# Patient Record
Sex: FEMALE | Race: OTHER | HISPANIC OR LATINO | Employment: UNEMPLOYED | URBAN - METROPOLITAN AREA
[De-identification: names, ages, dates, MRNs, and addresses within clinical notes are randomized per-mention and may not be internally consistent; named-entity substitution may affect disease eponyms.]

---

## 2022-04-07 ENCOUNTER — TELEPHONE (OUTPATIENT)
Dept: SCHEDULING | Facility: IMAGING CENTER | Age: 62
End: 2022-04-07

## 2022-11-25 ENCOUNTER — APPOINTMENT (OUTPATIENT)
Dept: RADIOLOGY | Facility: MEDICAL CENTER | Age: 62
DRG: 871 | End: 2022-11-25
Attending: EMERGENCY MEDICINE
Payer: COMMERCIAL

## 2022-11-25 ENCOUNTER — HOSPITAL ENCOUNTER (INPATIENT)
Facility: MEDICAL CENTER | Age: 62
LOS: 1 days | DRG: 871 | End: 2022-11-26
Attending: EMERGENCY MEDICINE | Admitting: STUDENT IN AN ORGANIZED HEALTH CARE EDUCATION/TRAINING PROGRAM
Payer: COMMERCIAL

## 2022-11-25 DIAGNOSIS — A41.9 SEPSIS WITHOUT ACUTE ORGAN DYSFUNCTION, DUE TO UNSPECIFIED ORGANISM (HCC): ICD-10-CM

## 2022-11-25 DIAGNOSIS — J10.1 INFLUENZA A: ICD-10-CM

## 2022-11-25 DIAGNOSIS — R10.84 GENERALIZED ABDOMINAL PAIN: ICD-10-CM

## 2022-11-25 DIAGNOSIS — J18.9 PNEUMONIA OF LEFT LOWER LOBE DUE TO INFECTIOUS ORGANISM: ICD-10-CM

## 2022-11-25 LAB
ALBUMIN SERPL BCP-MCNC: 3.2 G/DL (ref 3.2–4.9)
ALBUMIN/GLOB SERPL: 0.5 G/DL
ALP SERPL-CCNC: 128 U/L (ref 30–99)
ALT SERPL-CCNC: 10 U/L (ref 2–50)
ANION GAP SERPL CALC-SCNC: 13 MMOL/L (ref 7–16)
AST SERPL-CCNC: 18 U/L (ref 12–45)
BASOPHILS # BLD AUTO: 0.7 % (ref 0–1.8)
BASOPHILS # BLD: 0.1 K/UL (ref 0–0.12)
BILIRUB SERPL-MCNC: 0.4 MG/DL (ref 0.1–1.5)
BUN SERPL-MCNC: 16 MG/DL (ref 8–22)
CALCIUM SERPL-MCNC: 8.6 MG/DL (ref 8.5–10.5)
CHLORIDE SERPL-SCNC: 97 MMOL/L (ref 96–112)
CO2 SERPL-SCNC: 22 MMOL/L (ref 20–33)
CREAT SERPL-MCNC: 0.67 MG/DL (ref 0.5–1.4)
CRP SERPL HS-MCNC: 23.81 MG/DL (ref 0–0.75)
EKG IMPRESSION: NORMAL
EOSINOPHIL # BLD AUTO: 0.03 K/UL (ref 0–0.51)
EOSINOPHIL NFR BLD: 0.2 % (ref 0–6.9)
ERYTHROCYTE [DISTWIDTH] IN BLOOD BY AUTOMATED COUNT: 47.8 FL (ref 35.9–50)
FLUAV RNA SPEC QL NAA+PROBE: POSITIVE
FLUBV RNA SPEC QL NAA+PROBE: NEGATIVE
GFR SERPLBLD CREATININE-BSD FMLA CKD-EPI: 98 ML/MIN/1.73 M 2
GLOBULIN SER CALC-MCNC: 6 G/DL (ref 1.9–3.5)
GLUCOSE SERPL-MCNC: 134 MG/DL (ref 65–99)
HCT VFR BLD AUTO: 37.3 % (ref 37–47)
HGB BLD-MCNC: 12.5 G/DL (ref 12–16)
IMM GRANULOCYTES # BLD AUTO: 0.13 K/UL (ref 0–0.11)
IMM GRANULOCYTES NFR BLD AUTO: 1 % (ref 0–0.9)
LACTATE SERPL-SCNC: 1.7 MMOL/L (ref 0.5–2)
LACTATE SERPL-SCNC: 2.4 MMOL/L (ref 0.5–2)
LIPASE SERPL-CCNC: 40 U/L (ref 11–82)
LYMPHOCYTES # BLD AUTO: 1.03 K/UL (ref 1–4.8)
LYMPHOCYTES NFR BLD: 7.6 % (ref 22–41)
MCH RBC QN AUTO: 30.2 PG (ref 27–33)
MCHC RBC AUTO-ENTMCNC: 33.5 G/DL (ref 33.6–35)
MCV RBC AUTO: 90.1 FL (ref 81.4–97.8)
MONOCYTES # BLD AUTO: 0.68 K/UL (ref 0–0.85)
MONOCYTES NFR BLD AUTO: 5 % (ref 0–13.4)
NEUTROPHILS # BLD AUTO: 11.58 K/UL (ref 2–7.15)
NEUTROPHILS NFR BLD: 85.5 % (ref 44–72)
NRBC # BLD AUTO: 0 K/UL
NRBC BLD-RTO: 0 /100 WBC
PLATELET # BLD AUTO: 288 K/UL (ref 164–446)
PMV BLD AUTO: 11 FL (ref 9–12.9)
POTASSIUM SERPL-SCNC: 3.5 MMOL/L (ref 3.6–5.5)
PROCALCITONIN SERPL-MCNC: 0.5 NG/ML
PROT SERPL-MCNC: 9.2 G/DL (ref 6–8.2)
RBC # BLD AUTO: 4.14 M/UL (ref 4.2–5.4)
RSV RNA SPEC QL NAA+PROBE: NEGATIVE
SARS-COV-2 RNA RESP QL NAA+PROBE: NOTDETECTED
SODIUM SERPL-SCNC: 132 MMOL/L (ref 135–145)
SPECIMEN SOURCE: ABNORMAL
TROPONIN T SERPL-MCNC: 8 NG/L (ref 6–19)
WBC # BLD AUTO: 13.6 K/UL (ref 4.8–10.8)

## 2022-11-25 PROCEDURE — 96375 TX/PRO/DX INJ NEW DRUG ADDON: CPT

## 2022-11-25 PROCEDURE — 99285 EMERGENCY DEPT VISIT HI MDM: CPT

## 2022-11-25 PROCEDURE — 0241U HCHG SARS-COV-2 COVID-19 NFCT DS RESP RNA 4 TRGT MIC: CPT

## 2022-11-25 PROCEDURE — 71045 X-RAY EXAM CHEST 1 VIEW: CPT

## 2022-11-25 PROCEDURE — A9270 NON-COVERED ITEM OR SERVICE: HCPCS | Performed by: EMERGENCY MEDICINE

## 2022-11-25 PROCEDURE — 85025 COMPLETE CBC W/AUTO DIFF WBC: CPT

## 2022-11-25 PROCEDURE — 36415 COLL VENOUS BLD VENIPUNCTURE: CPT

## 2022-11-25 PROCEDURE — 83605 ASSAY OF LACTIC ACID: CPT

## 2022-11-25 PROCEDURE — 83036 HEMOGLOBIN GLYCOSYLATED A1C: CPT

## 2022-11-25 PROCEDURE — 700105 HCHG RX REV CODE 258: Performed by: EMERGENCY MEDICINE

## 2022-11-25 PROCEDURE — C9803 HOPD COVID-19 SPEC COLLECT: HCPCS | Performed by: EMERGENCY MEDICINE

## 2022-11-25 PROCEDURE — 83690 ASSAY OF LIPASE: CPT

## 2022-11-25 PROCEDURE — 87040 BLOOD CULTURE FOR BACTERIA: CPT

## 2022-11-25 PROCEDURE — 93005 ELECTROCARDIOGRAM TRACING: CPT

## 2022-11-25 PROCEDURE — 93005 ELECTROCARDIOGRAM TRACING: CPT | Performed by: EMERGENCY MEDICINE

## 2022-11-25 PROCEDURE — 700102 HCHG RX REV CODE 250 W/ 637 OVERRIDE(OP): Performed by: EMERGENCY MEDICINE

## 2022-11-25 PROCEDURE — 700111 HCHG RX REV CODE 636 W/ 250 OVERRIDE (IP): Performed by: EMERGENCY MEDICINE

## 2022-11-25 PROCEDURE — 84145 PROCALCITONIN (PCT): CPT

## 2022-11-25 PROCEDURE — 84484 ASSAY OF TROPONIN QUANT: CPT

## 2022-11-25 PROCEDURE — 80053 COMPREHEN METABOLIC PANEL: CPT

## 2022-11-25 PROCEDURE — 86140 C-REACTIVE PROTEIN: CPT

## 2022-11-25 RX ORDER — SODIUM CHLORIDE 9 MG/ML
1000 INJECTION, SOLUTION INTRAVENOUS ONCE
Status: COMPLETED | OUTPATIENT
Start: 2022-11-25 | End: 2022-11-26

## 2022-11-25 RX ORDER — ACETAMINOPHEN 325 MG/1
650 TABLET ORAL ONCE
Status: COMPLETED | OUTPATIENT
Start: 2022-11-25 | End: 2022-11-25

## 2022-11-25 RX ORDER — CEFTRIAXONE 1 G/1
1000 INJECTION, POWDER, FOR SOLUTION INTRAMUSCULAR; INTRAVENOUS ONCE
Status: COMPLETED | OUTPATIENT
Start: 2022-11-25 | End: 2022-11-25

## 2022-11-25 RX ORDER — AZITHROMYCIN 500 MG/5ML
500 INJECTION, POWDER, LYOPHILIZED, FOR SOLUTION INTRAVENOUS ONCE
Status: COMPLETED | OUTPATIENT
Start: 2022-11-25 | End: 2022-11-26

## 2022-11-25 RX ORDER — SODIUM CHLORIDE 9 MG/ML
1000 INJECTION, SOLUTION INTRAVENOUS ONCE
Status: COMPLETED | OUTPATIENT
Start: 2022-11-25 | End: 2022-11-25

## 2022-11-25 RX ADMIN — SODIUM CHLORIDE 1000 ML: 9 INJECTION, SOLUTION INTRAVENOUS at 23:30

## 2022-11-25 RX ADMIN — SODIUM CHLORIDE 1000 ML: 9 INJECTION, SOLUTION INTRAVENOUS at 21:51

## 2022-11-25 RX ADMIN — CEFTRIAXONE SODIUM 1000 MG: 1 INJECTION, POWDER, FOR SOLUTION INTRAMUSCULAR; INTRAVENOUS at 21:50

## 2022-11-25 RX ADMIN — ACETAMINOPHEN 650 MG: 325 TABLET, FILM COATED ORAL at 21:35

## 2022-11-26 VITALS
HEART RATE: 79 BPM | WEIGHT: 97.22 LBS | TEMPERATURE: 98.2 F | SYSTOLIC BLOOD PRESSURE: 114 MMHG | BODY MASS INDEX: 17.89 KG/M2 | RESPIRATION RATE: 13 BRPM | OXYGEN SATURATION: 98 % | HEIGHT: 62 IN | DIASTOLIC BLOOD PRESSURE: 60 MMHG

## 2022-11-26 PROBLEM — Z91.148 HISTORY OF MEDICATION NONCOMPLIANCE: Status: ACTIVE | Noted: 2022-11-26

## 2022-11-26 PROBLEM — B20 HIV (HUMAN IMMUNODEFICIENCY VIRUS INFECTION) (HCC): Status: ACTIVE | Noted: 2022-11-26

## 2022-11-26 PROBLEM — E87.20 LACTIC ACIDOSIS: Status: ACTIVE | Noted: 2022-11-26

## 2022-11-26 PROBLEM — A41.9 SEPSIS (HCC): Status: ACTIVE | Noted: 2022-11-26

## 2022-11-26 PROBLEM — J10.1 INFLUENZA A: Status: ACTIVE | Noted: 2022-11-26

## 2022-11-26 PROBLEM — J18.9 PNEUMONIA: Status: ACTIVE | Noted: 2022-11-26

## 2022-11-26 PROBLEM — E87.6 HYPOKALEMIA: Status: ACTIVE | Noted: 2022-11-26

## 2022-11-26 PROBLEM — F15.10 METHAMPHETAMINE USE (HCC): Status: ACTIVE | Noted: 2022-11-26

## 2022-11-26 PROBLEM — F19.10 POLYSUBSTANCE ABUSE (HCC): Status: ACTIVE | Noted: 2022-11-26

## 2022-11-26 PROBLEM — E87.1 HYPONATREMIA: Status: ACTIVE | Noted: 2022-11-26

## 2022-11-26 LAB
AMPHET UR QL SCN: POSITIVE
BARBITURATES UR QL SCN: NEGATIVE
BENZODIAZ UR QL SCN: NEGATIVE
BZE UR QL SCN: NEGATIVE
CANNABINOIDS UR QL SCN: NEGATIVE
EST. AVERAGE GLUCOSE BLD GHB EST-MCNC: 123 MG/DL
HBA1C MFR BLD: 5.9 % (ref 4–5.6)
METHADONE UR QL SCN: NEGATIVE
OPIATES UR QL SCN: POSITIVE
OXYCODONE UR QL SCN: NEGATIVE
PCP UR QL SCN: NEGATIVE
PROPOXYPH UR QL SCN: NEGATIVE

## 2022-11-26 PROCEDURE — 96372 THER/PROPH/DIAG INJ SC/IM: CPT

## 2022-11-26 PROCEDURE — 87205 SMEAR GRAM STAIN: CPT

## 2022-11-26 PROCEDURE — 306637 HCHG MISC ORTHO ITEM RC 0274

## 2022-11-26 PROCEDURE — 700101 HCHG RX REV CODE 250: Performed by: EMERGENCY MEDICINE

## 2022-11-26 PROCEDURE — 96365 THER/PROPH/DIAG IV INF INIT: CPT

## 2022-11-26 PROCEDURE — 700111 HCHG RX REV CODE 636 W/ 250 OVERRIDE (IP): Performed by: EMERGENCY MEDICINE

## 2022-11-26 PROCEDURE — 80307 DRUG TEST PRSMV CHEM ANLYZR: CPT

## 2022-11-26 PROCEDURE — 87070 CULTURE OTHR SPECIMN AEROBIC: CPT

## 2022-11-26 PROCEDURE — 700117 HCHG RX CONTRAST REV CODE 255: Performed by: EMERGENCY MEDICINE

## 2022-11-26 PROCEDURE — 700111 HCHG RX REV CODE 636 W/ 250 OVERRIDE (IP): Performed by: INTERNAL MEDICINE

## 2022-11-26 PROCEDURE — 99223 1ST HOSP IP/OBS HIGH 75: CPT | Mod: GC | Performed by: STUDENT IN AN ORGANIZED HEALTH CARE EDUCATION/TRAINING PROGRAM

## 2022-11-26 PROCEDURE — 74177 CT ABD & PELVIS W/CONTRAST: CPT

## 2022-11-26 PROCEDURE — 700105 HCHG RX REV CODE 258: Performed by: EMERGENCY MEDICINE

## 2022-11-26 PROCEDURE — 700105 HCHG RX REV CODE 258: Performed by: INTERNAL MEDICINE

## 2022-11-26 RX ORDER — AMOXICILLIN 250 MG
2 CAPSULE ORAL 2 TIMES DAILY
Status: DISCONTINUED | OUTPATIENT
Start: 2022-11-26 | End: 2022-11-26 | Stop reason: HOSPADM

## 2022-11-26 RX ORDER — AZITHROMYCIN 250 MG/1
250 TABLET, FILM COATED ORAL DAILY
Status: DISCONTINUED | OUTPATIENT
Start: 2022-11-27 | End: 2022-11-26

## 2022-11-26 RX ORDER — HYDRALAZINE HYDROCHLORIDE 20 MG/ML
10 INJECTION INTRAMUSCULAR; INTRAVENOUS EVERY 4 HOURS PRN
Status: DISCONTINUED | OUTPATIENT
Start: 2022-11-26 | End: 2022-11-26 | Stop reason: HOSPADM

## 2022-11-26 RX ORDER — ENOXAPARIN SODIUM 100 MG/ML
40 INJECTION SUBCUTANEOUS DAILY
Status: DISCONTINUED | OUTPATIENT
Start: 2022-11-26 | End: 2022-11-26 | Stop reason: HOSPADM

## 2022-11-26 RX ORDER — OSELTAMIVIR PHOSPHATE 75 MG/1
75 CAPSULE ORAL 2 TIMES DAILY
Status: DISCONTINUED | OUTPATIENT
Start: 2022-11-26 | End: 2022-11-26 | Stop reason: HOSPADM

## 2022-11-26 RX ORDER — POTASSIUM CHLORIDE 20 MEQ/1
40 TABLET, EXTENDED RELEASE ORAL ONCE
Status: DISCONTINUED | OUTPATIENT
Start: 2022-11-26 | End: 2022-11-26 | Stop reason: HOSPADM

## 2022-11-26 RX ORDER — SODIUM CHLORIDE 9 MG/ML
INJECTION, SOLUTION INTRAVENOUS CONTINUOUS
Status: DISCONTINUED | OUTPATIENT
Start: 2022-11-26 | End: 2022-11-26 | Stop reason: HOSPADM

## 2022-11-26 RX ORDER — SULFAMETHOXAZOLE AND TRIMETHOPRIM 800; 160 MG/1; MG/1
2 TABLET ORAL 3 TIMES DAILY
Status: DISCONTINUED | OUTPATIENT
Start: 2022-11-26 | End: 2022-11-26 | Stop reason: HOSPADM

## 2022-11-26 RX ORDER — ACETAMINOPHEN 325 MG/1
650 TABLET ORAL EVERY 6 HOURS PRN
Status: DISCONTINUED | OUTPATIENT
Start: 2022-11-26 | End: 2022-11-26

## 2022-11-26 RX ORDER — POLYETHYLENE GLYCOL 3350 17 G/17G
1 POWDER, FOR SOLUTION ORAL
Status: DISCONTINUED | OUTPATIENT
Start: 2022-11-26 | End: 2022-11-26 | Stop reason: HOSPADM

## 2022-11-26 RX ORDER — BISACODYL 10 MG
10 SUPPOSITORY, RECTAL RECTAL
Status: DISCONTINUED | OUTPATIENT
Start: 2022-11-26 | End: 2022-11-26 | Stop reason: HOSPADM

## 2022-11-26 RX ADMIN — IOHEXOL 95 ML: 350 INJECTION, SOLUTION INTRAVENOUS at 01:06

## 2022-11-26 RX ADMIN — SODIUM CHLORIDE: 9 INJECTION, SOLUTION INTRAVENOUS at 03:30

## 2022-11-26 RX ADMIN — ENOXAPARIN SODIUM 40 MG: 40 INJECTION SUBCUTANEOUS at 03:05

## 2022-11-26 RX ADMIN — AZITHROMYCIN MONOHYDRATE 500 MG: 500 INJECTION, POWDER, LYOPHILIZED, FOR SOLUTION INTRAVENOUS at 00:33

## 2022-11-26 ASSESSMENT — ENCOUNTER SYMPTOMS
HEADACHES: 0
CHILLS: 1
PALPITATIONS: 0
MYALGIAS: 0
PHOTOPHOBIA: 0
ORTHOPNEA: 0
CLAUDICATION: 0
COUGH: 1
SPUTUM PRODUCTION: 1
VOMITING: 0
FEVER: 1
DIZZINESS: 0
HEARTBURN: 0
BLURRED VISION: 0
WHEEZING: 0
NECK PAIN: 0
NAUSEA: 1

## 2022-11-26 ASSESSMENT — LIFESTYLE VARIABLES: SUBSTANCE_ABUSE: 1

## 2022-11-26 NOTE — ASSESSMENT & PLAN NOTE
Presentation concerning for underlying pneumonia, with leukocytosis, fever, positive Pro-Alex and bilateral lung base opacifications on CT scan.  S/p 1 dose of ceftriaxone and azithromycin in the ED  Blood cultures collected in the ED  Sputum cultures have been ordered and are pending    -We will continue ceftriaxone and 250 mg azithromycin for total of 5 days  -RT protocol

## 2022-11-26 NOTE — ED NOTES
PT ambulated to the RR with assistance. Pt upon ambulation back to the room wondered off and was found over by PEDS triage. Pt was helped back to the room at this time. Attending notified and will come down to see her.

## 2022-11-26 NOTE — ASSESSMENT & PLAN NOTE
Secondary to poor diet and vomiting in the setting of ongoing infection  -Potassium repletion ordered

## 2022-11-26 NOTE — ASSESSMENT & PLAN NOTE
Unclear when patient required influenza as she reports that her symptoms have been ongoing for the last 3 weeks.  Will initiate oseltamavir considering patient is high risk, 75 mg twice daily for 5 days  Incentive spirometry  RT protocol  Telemetry monitoring with continuous pulse ox

## 2022-11-26 NOTE — ASSESSMENT & PLAN NOTE
This is Sepsis Present on admission  SIRS criteria identified on my evaluation include: Fever, with temperature greater than 101 deg F, Tachycardia, with heart rate greater than 90 BPM and Leukocytosis, with WBC greater than 12,000  Source is pulmonary/lungs  Sepsis protocol initiated  Fluid resuscitation ordered per protocol  Crystalloid Fluid Administration: Fluid resuscitation ordered per standard protocol - 30 mL/kg per current or ideal body weight  IV antibiotics as appropriate for source of sepsis  Reassessment: I have reassessed the patient's hemodynamic status

## 2022-11-26 NOTE — DISCHARGE SUMMARY
[facility] Internal Medicine Discharge Summary    Attending: John Bowen M.d.  Senior Resident: Dr. Downey   Contact Number: [phone]    CHIEF COMPLAINT ON ADMISSION  Chief Complaint   Patient presents with    Flu Like Symptoms     Cough, N/V/D, fever, no appetite x 3 weeks. Fever 102f on arrival. EMS gave 1g tylenol and 600mg ibuprofen PTA. Hx HIV.        Reason for Admission  Nausea, vomiting, diarrhea     Admission Date  11/25/2022    CODE STATUS  Full Code    HPI & HOSPITAL COURSE   Karla Baker is a 62 y.o. female who presented 11/25/2022 with past medical history of HIV (following up with [facility], she was on Triumeq 600- mg, 1 tablet daily that she last filled 8/31/2022 for 30-day supply and has been off of HIV therapy since.  She also has a history of drug abuse, describes that she uses heroin daily, mostly subcutaneous and occasionally IV.  She is a current smoker, around 1 pack/day, 20-year smoking history. She is positive for amphetamines and opiates.  She describes that over the last 2-3 weeks she has been having worsening fevers/chills, malaise/fatigue, along with cough productive of yellow green phlegm.  She tried getting into the [facility] clinic this week, however reports that she was sent home with recommendation to come back on this coming Monday.  She is influenza A positive and started on Tamiflu.  CT scan showed bilateral opacities, with elevated procalcitonin concerning for pneumonia.  Will start on treatment for possible PCP pneumonia due to history of HIV. CD4 count, HIV viral load, LDH, Beta d glucan, Quantiferon gold pending.   However, patient remains pregnant and she would like to leave the hospital AGAINST MEDICAL ADVICE at this time    Therefore, she is discharged in guarded and stable condition against medcial advice.    The patient met 2-midnight criteria for an inpatient stay at the time of discharge.    Discharge Date  11/26/22    Physical Exam on Day of  Discharge  Physical Exam  Constitutional:       General: She is not in acute distress.  HENT:      Head: Normocephalic and atraumatic.      Right Ear: External ear normal.      Left Ear: External ear normal.      Nose: Nose normal. No congestion.      Mouth/Throat:      Mouth: Mucous membranes are moist.   Eyes:      Extraocular Movements: Extraocular movements intact.      Pupils: Pupils are equal, round, and reactive to light.   Cardiovascular:      Rate and Rhythm: Normal rate and regular rhythm.      Pulses: Normal pulses.   Pulmonary:      Effort: Pulmonary effort is normal. No respiratory distress.      Breath sounds: No wheezing.   Abdominal:      General: Abdomen is flat. Bowel sounds are normal.      Palpations: Abdomen is soft.      Comments: Multiple needle marks and scarring on the abdomen    Musculoskeletal:         General: Normal range of motion.      Right lower leg: No edema.      Left lower leg: No edema.   Skin:     General: Skin is warm.      Capillary Refill: Capillary refill takes less than 2 seconds.   Neurological:      Mental Status: She is alert and oriented to person, place, and time.   Psychiatric:         Mood and Affect: Mood normal.     FOLLOW UP ITEMS POST DISCHARGE  Follow-up with [facility]    DISCHARGE DIAGNOSES  Principal Problem:    Influenza A POA: Unknown  Active Problems:    Sepsis ([facility]) POA: Yes    Community-acquired pneumonia POA: Unknown    HIV (human immunodeficiency virus infection) ([facility]) POA: Unknown    Hypokalemia POA: Unknown    Hyponatremia POA: Unknown    Lactic acidosis POA: Unknown    Polysubstance abuse ([facility]) POA: Unknown    Methamphetamine use ([facility]) POA: Unknown    Medication noncompliance POA: Unknown  Resolved Problems:    * No resolved hospital problems. *      FOLLOW UP  No future appointments.  No follow-up provider specified.    MEDICATIONS ON DISCHARGE     Medication List      You have not been prescribed any medications.         Allergies  No Known  Allergies    DIET  Orders Placed This Encounter   Procedures    Diet Order Diet: Regular     Standing Status:   Standing     Number of Occurrences:   1     Order Specific Question:   Diet:     Answer:   Regular [1]       ACTIVITY  As tolerated.  Weight bearing as tolerated    CONSULTATIONS  N/A    PROCEDURES  N/A     LABORATORY  Lab Results   Component Value Date    SODIUM 132 (L) 11/25/2022    POTASSIUM 3.5 (L) 11/25/2022    CHLORIDE 97 11/25/2022    CO2 22 11/25/2022    GLUCOSE 134 (H) 11/25/2022    BUN 16 11/25/2022    CREATININE 0.67 11/25/2022        Lab Results   Component Value Date    WBC 13.6 (H) 11/25/2022    HEMOGLOBIN 12.5 11/25/2022    HEMATOCRIT 37.3 11/25/2022    PLATELETCT 288 11/25/2022        Total time of the discharge process exceeds 30 minutes.

## 2022-11-26 NOTE — ASSESSMENT & PLAN NOTE
Lactic acid 2.4 on presentation that down trended to 1.7 after fluid resuscitation.  Likely type a lactic acidosis in the setting of underlying infection

## 2022-11-26 NOTE — ASSESSMENT & PLAN NOTE
Patient with a history of HIV, following up with [facility] clinic, she was on Triumeq 600- mg, 1 tablet daily that she last filled 8/31/2022 for 30-day supply and has been off of HIV therapy since    Plan:  -Get HIV viral load  -Follow-up with [facility] clinic  -Reinitiate antiviral therapy

## 2022-11-26 NOTE — ED TRIAGE NOTES
"Chief Complaint   Patient presents with    Flu Like Symptoms     Cough, N/V/D, fever, no appetite x 3 weeks. Fever 102f on arrival. EMS gave 1g tylenol and 600mg ibuprofen PTA. Hx HIV.      Pt BIBA for above complaint. Pt heroin user, last use 10 days. Pt amb in triage with steady gait.     /63   Pulse (!) 121   Temp (!) 38.9 °C (102 °F) (Temporal)   Resp 18   Ht 1.575 m (5' 2\")   Wt 44.1 kg (97 lb 3.6 oz)   SpO2 92%   BMI 17.78 kg/m²     "

## 2022-11-26 NOTE — ASSESSMENT & PLAN NOTE
Patient with a history of HIV, following up with [facility] clinic, she was on Triumeq 600- mg, 1 tablet daily that she last filled 8/31/2022 for 30-day supply and has been off of HIV therapy since    Plan:  -Get HIV viral load  -Follow-up with [facility] clinic  -Reinitiate antiviral therapy

## 2022-11-26 NOTE — H&P
[facility] Internal Medicine History & Physical Note    Date of Service  11/26/2022    R Team: SULMA   Attending: John Bowen M.d.  Senior Resident: Dr. Downey   Contact Number: [phone]    Primary Care Physician  Stoney Linder D.O.    Consultants  None    Specialist Names: N/A     Code Status  Full Code    Chief Complaint  Chief Complaint   Patient presents with    Flu Like Symptoms     Cough, N/V/D, fever, no appetite x 3 weeks. Fever 102f on arrival. EMS gave 1g tylenol and 600mg ibuprofen PTA. Hx HIV.        History of Presenting Illness (HPI):   Karla Baker is a 62 y.o. female who presented 11/25/2022 with past medical history of HIV (following up with [facility], she was on Triumeq 600- mg, 1 tablet daily that she last filled 8/31/2022 for 30-day supply and has been off of HIV therapy since.  She also has a history of drug abuse, describes that she uses heroin daily, mostly subcutaneous and occasionally IV.  She is a current smoker, around 1 pack/day, 20-year smoking history.  She describes that over the last 2-3 weeks she has been having worsening fevers/chills, malaise/fatigue, along with cough productive of yellow green phlegm.  She tried getting into the [facility]"" clinic this week, however reports that she was sent home with recommendation to come back on this coming Monday.  Describes that she is interested in quitting heroin use.    Vitals in the ED  Patient was febrile with temperature 102 °F, tachycardic with pulse 121, RR 18 and blood pressure 107/73    Labs  With leukocytosis 13.6  Hypokalemia with a potassium of 3.5  Hyponatremia with sodium 132  Glucose high at 128  Alk phos 128  Lactic acidosis with lactate 2.4 on presentation that improved to 1.7 with fluid resuscitation  Troponins negative at 8  Pro-Alex 0.50  She was found to be positive for influenza A  C-reactive protein 23.81    Chest x-ray showed  Bilateral pulmonary infiltrates more at the left lung base    CT abdomen  pelvis with contrast showed  Opacifications at the bilateral lung base concerning for pneumonitis versus pneumonia  Trace pericholecystic fluid  Biliary ductal dilation    EKG with  Sinus tachycardia, rate 105 and QTc 422    Blood cultures were collected in the ED  She received NS boluses along with 1 dose of ceftriaxone and azithromycin in the ED      I discussed the plan of care with patient, bedside RN, and attending provider and made plan of care per discussion with the attending .    Review of Systems  Review of Systems   Constitutional:  Positive for chills, fever and malaise/fatigue.   HENT:  Negative for hearing loss and tinnitus.    Eyes:  Negative for blurred vision and photophobia.   Respiratory:  Positive for cough and sputum production. Negative for wheezing.    Cardiovascular:  Negative for palpitations, orthopnea and claudication.   Gastrointestinal:  Positive for nausea. Negative for heartburn and vomiting.   Genitourinary:  Negative for dysuria and urgency.   Musculoskeletal:  Negative for myalgias and neck pain.   Skin:  Negative for itching and rash.   Neurological:  Negative for dizziness and headaches.   Psychiatric/Behavioral:  Positive for substance abuse.      Past Medical History   has no past medical history on file.    Surgical History   has no past surgical history on file.     Family History  family history is not on file.   Family history reviewed with patient.     Social History  Tobacco: Current smoker, 20 pack year smoking hx (1ppp x 20 years)  Alcohol: Denies   Recreational drugs (illegal or prescription): Heroin IV and SC   Employment: Unemployed   Living Situation: Currently living in a motel   Recent Travel: None   Primary Care Provider: Reviewed    Other (stressors, spirituality, exposures): Denies     Allergies  No Known Allergies    Medications  None       Physical Exam  Temp:  [36.8 °C (98.2 °F)-38.9 °C (102 °F)] 36.8 °C (98.2 °F)  Pulse:  [] 79  Resp:  [13-20] 13  BP:  ()/(53-63) 114/60  SpO2:  [91 %-98 %] 98 %  Blood Pressure: 114/60   Temperature: 36.8 °C (98.2 °F)   Pulse: 79   Respiration: 13   Pulse Oximetry: 98 %       Physical Exam  Constitutional:       General: She is not in acute distress.  HENT:      Head: Normocephalic and atraumatic.      Right Ear: External ear normal.      Left Ear: External ear normal.      Nose: Nose normal. No congestion.      Mouth/Throat:      Mouth: Mucous membranes are moist.   Eyes:      Extraocular Movements: Extraocular movements intact.      Pupils: Pupils are equal, round, and reactive to light.   Cardiovascular:      Rate and Rhythm: Normal rate and regular rhythm.      Pulses: Normal pulses.   Pulmonary:      Effort: Pulmonary effort is normal. No respiratory distress.      Breath sounds: No wheezing.   Abdominal:      General: Abdomen is flat. Bowel sounds are normal.      Palpations: Abdomen is soft.      Comments: Multiple needle marks and scarring on the abdomen    Musculoskeletal:         General: Normal range of motion.      Right lower leg: No edema.      Left lower leg: No edema.   Skin:     General: Skin is warm.      Capillary Refill: Capillary refill takes less than 2 seconds.   Neurological:      Mental Status: She is alert and oriented to person, place, and time.   Psychiatric:         Mood and Affect: Mood normal.       Laboratory:  Recent Labs     11/25/22 2023   WBC 13.6*   RBC 4.14*   HEMOGLOBIN 12.5   HEMATOCRIT 37.3   MCV 90.1   MCH 30.2   MCHC 33.5*   RDW 47.8   PLATELETCT 288   MPV 11.0     Recent Labs     11/25/22 2023   SODIUM 132*   POTASSIUM 3.5*   CHLORIDE 97   CO2 22   GLUCOSE 134*   BUN 16   CREATININE 0.67   CALCIUM 8.6     Recent Labs     11/25/22 2023   ALTSGPT 10   ASTSGOT 18   ALKPHOSPHAT 128*   TBILIRUBIN 0.4   LIPASE 40   GLUCOSE 134*         No results for input(s): NTPROBNP in the last 72 hours.      Recent Labs     11/25/22 2023   TROPONINT 8       Imaging:  CT-ABDOMEN-PELVIS WITH   Final  Result      1.  Opacifications are noted in each lung base as described above. Findings could be due to pneumonitis or pneumonia.      2.  Trace pericholecystic fluid is noted.      3.  Biliary ductal dilatation is identified.      DX-CHEST-PORTABLE (1 VIEW)   Final Result         1.  Pulmonary opacifications are noted in the lung bases, greater on the left side. Findings could be due to pneumonia. Aspiration pneumonia is possible. Pulmonary edema is also possible.          X-Ray:  I have personally reviewed the images and compared with prior images.  EKG:  I have personally reviewed the images and compared with prior images.  CT abd/pelvis: I have personally reviewed the images and compared with prior imaging    Assessment/Plan:  Problem Representation:   62-year-old female with past medical history of HIV (off of therapy for 2 months, previously established with [facility]), polysubstance abuse including IV and SC heroin and tobacco smoking presented to the ED with ongoing fevers, chills, cough productive of yellow-green phlegm admitted for sepsis secondary to influenza A and Pneumonia (PCP vs CAP)     I anticipate this patient will require at least two midnights for appropriate medical management, necessitating inpatient admission because of flu with likely sepsis    Patient will need a Telemetry bed on MEDICAL service .  The need is secondary to sepsis.    * Influenza A  Assessment & Plan  Unclear when patient required influenza as she reports that her symptoms have been ongoing for the last 3 weeks.  Will initiate oseltamavir considering patient is high risk, 75 mg twice daily for 5 days  Incentive spirometry  RT protocol  Telemetry monitoring with continuous pulse ox    Community-acquired pneumonia  Rule out PCP Pneumonia   Assessment & Plan  Presentation concerning for underlying pneumonia, with leukocytosis, fever, positive Pro-Alex. PCP PNA cannot be excluded in the setting of h/o HIV, off of  treatment  Bilateral lung base opacifications on CT scan.  S/p 1 dose of ceftriaxone and azithromycin in the ED  Blood cultures collected in the ED  Sputum cultures have been ordered and are pending  CD4 counts, LDH, beta D glucan levels to support the diagnosis of PCP have been ordered and are pending. Consider HRCT.   -Initiate bactrim for presumed PCP  -F/u blood/sputum cultures and LDH levels  -RT protocol        Sepsis ([facility])- (present on admission)  Assessment & Plan  This is Sepsis Present on admission  SIRS criteria identified on my evaluation include: Fever, with temperature greater than 101 deg F, Tachycardia, with heart rate greater than 90 BPM and Leukocytosis, with WBC greater than 12,000  Source is pulmonary/lungs  Sepsis protocol initiated  Fluid resuscitation ordered per protocol  Crystalloid Fluid Administration: Fluid resuscitation ordered per standard protocol - 30 mL/kg per current or ideal body weight  IV antibiotics as appropriate for source of sepsis  Reassessment: I have reassessed the patient's hemodynamic status          HIV (human immunodeficiency virus infection) ([facility])  Assessment & Plan  Patient with a history of HIV, following up with [facility] clinic, she was on Triumeq 600- mg, 1 tablet daily that she last filled 8/31/2022 for 30-day supply and has been off of HIV therapy since    Plan:  -Get HIV viral load  -Consult Dr. Gonzalez in the AM (did not reinitiate antiretroviral therapy at this time to prevent immune reconstitution syndrome)  -Quantiferone gold, pending   -Urine strep, legionella pending   -Serum LDH, pending  -Initiate Bactrim for concerns of underlying PCP PNA     Lactic acidosis  Assessment & Plan  Lactic acid 2.4 on presentation that down trended to 1.7 after fluid resuscitation.  Likely type a lactic acidosis in the setting of underlying infection     Hyponatremia  Assessment & Plan  Likely secondary to dehydration  S/p NS fluid boluses in the ED  Continue fluid  supplementation/maintenance fluids  Serum osmolality, urine osmolality and urine sodium have been ordered and are pending    Hypokalemia  Assessment & Plan  Secondary to poor diet and vomiting in the setting of ongoing infection  -Potassium repletion ordered    Meth use   Assessment & Plan        UDS + for meth     Medication noncompliance   Assessment & Plan          Not refilled HIV meds last 2 months           Counseling     VTE prophylaxis: enoxaparin ppx  CODE STATUS: Full code

## 2022-11-26 NOTE — ASSESSMENT & PLAN NOTE
Likely secondary to dehydration  S/p NS fluid boluses in the ED  Continue fluid supplementation/maintenance fluids  Serum osmolality, urine osmolality and urine sodium have been ordered and are pending

## 2022-11-26 NOTE — ED NOTES
Pt placed call light on and stated that she wanted to go home and was very tearful. I told pt that I would let the Attending  Know. Dr. Bowen notified and stated that he would come down.

## 2022-11-26 NOTE — ED NOTES
"Med rec completed per patient at bedside.  Allergies reviewed with patient. SHARON.  Patient's preferred pharmacy: [facility].     Patient denies using any prescription medications at home at this time; per patient, she is \"supposed to be\" on an HIV medication, but ran out and has not had it for over a month. Per reconcile outside information patient last filled Triumeq 600- mg, 1 tablet once per day, on 8/31/2022 for a 30 days supply from [facility].  No vitamins or supplements.  No recent over-the-counter medications.  No outpatient antibiotics in the last 30 days.  "

## 2022-11-26 NOTE — ED NOTES
Pt came to the Nurses desk and stated that she is leaving and not waiting for the DrEdmond To come see her. Pt IV was removed, angio intact and pressure dressing was applied. Pt ambulated out with a steady gait. Attending  Notified at this time.

## 2022-11-26 NOTE — ED PROVIDER NOTES
ED Provider Note    CHIEF COMPLAINT  Chief Complaint   Patient presents with    Flu Like Symptoms     Cough, N/V/D, fever, no appetite x 3 weeks. Fever 102f on arrival. EMS gave 1g tylenol and 600mg ibuprofen PTA. Hx HIV.      HPI  Ms Basurto is a 62-year-old female with a past medical history of COPD, heroin use both IV and into the abdomen who presents to the emergency room for persistent fevers.  She has not been feeling like herself, she occasionally has a cough, she is not feeling well and came to the emergency department.  On initial assessment in triage she was tachycardic and febrile.  She reports that she is also HIV positive, she is established with [facility] since been taking her daily medications.  She has not on prophylactic antibiotics and believes that she had a reassuring CD4 count 2 months ago.  She has had some amount of vomiting, denies diarrheal illness, continues to inject heroin into her abdomen.    On pharmaceutical reconciliation it was noted that the patient had her last prescription filling out care HIV medications in mid-to-late August for 30-day supply with no subsequent refills.    PPE Note: I personally donned full PPE for all patient encounters during this visit, including being clean-shaven with an N95 respirator mask, gloves, and goggles.     REVIEW OF SYSTEMS  See HPI for further details. All other systems are negative.     PAST MEDICAL HISTORY   IVDU, HIV on medications    SOCIAL HISTORY  Social History     Tobacco Use    Smoking status: Every Day     Types: Cigarettes    Smokeless tobacco: Never   Vaping Use    Vaping Use: Never used   Substance and Sexual Activity    Alcohol use: Not Currently    Drug use: Yes     Comment: heroin    Sexual activity: Not on file       SURGICAL HISTORY  patient denies any surgical history    CURRENT MEDICATIONS  Home Medications       Reviewed by Grace Mahmood (Pharmacy Tech) on 11/25/22 at 2816  Med List Status: Complete     Medication Last Dose  "Status        Patient Patrice Taking any Medications                         ALLERGIES  No Known Allergies    PHYSICAL EXAM  VITAL SIGNS: /60   Pulse 79   Temp 36.8 °C (98.2 °F) (Oral)   Resp 13   Ht 1.575 m (5' 2\")   Wt 44.1 kg (97 lb 3.6 oz)   SpO2 98%   BMI 17.78 kg/m²   Pulse ox interpretation: I interpret this pulse ox as normal.  Genl: ill appearing F sitting in gurney uncomfortably, speaking clearly, appears in moderate distress   Head: NC/AT  ENT: Mucous membranes dry, posterior pharynx clear, uvula midline, nares patent bilaterally   Eyes: Normal sclera, pupils equal round reactive to light  Neck: Supple, FROM, no LAD appreciated   Pulmonary: Lungs are clear to auscultation bilaterally  Chest: No TTP  CV:  tachycardia, no murmur appreciated, pulses 2+ in both upper and lower extremities,  Abdomen: Firm, multiple areas of injection sites with wounds of various ages, warmth and erythema and scattered areas throughout the anterior surface of the abdomen.  No active fluctuance noted at this time.  Generally uncomfortable, nondistended, mild guarding throughout without true rebound observed.  No acute flank discomfort.  Because of scarring is difficult to fully appreciate true masses though there is no gross asymmetry and no appreciable HSM   : no CVA tenderness  Musculoskeletal: Pain free ROM of the neck. Moving upper and lower extremities and spontaneous in coordinated fashion  Neuro: A&Ox4 (person, place, time, situation), speech fluent, gait not assessed, no focal deficits appreciated, No cerebellar signs. Sensation is grossly intact in the distal upper and lower extremities.  5/5 strength in  and dorsiflexion/plantar flexion of the ankles  Psych: Patient has an appropriate affect and behavior  Skin: No rash or lesions.  No pallor or jaundice.  No cyanosis.  Warm and dry.     DIAGNOSTIC STUDIES / PROCEDURES    EKG  Results for orders placed or performed during the hospital encounter of " 22   EKG   Result Value Ref Range    Report       [facility]    Test Date:  2022  Pt Name:    TJ ROYAL HEART             Department: ER  MRN:        [id number]                      Room:  Gender:     Female                       Technician: [id number]  :        1960                   Requested By:ER TRIAGE PROTOCOL  Order #:    [id number]                    Reading MD:    Measurements  Intervals                                Axis  Rate:       105                          P:          71  WI:         116                          QRS:        34  QRSD:       80                           T:          33  QT:         319  QTc:        422    Interpretive Statements  Sinus tachycardia  Baseline wander in lead(s) II  No previous ECG available for comparison       LABS  Labs Reviewed   CBC WITH DIFFERENTIAL - Abnormal; Notable for the following components:       Result Value    WBC 13.6 (*)     RBC 4.14 (*)     MCHC 33.5 (*)     Neutrophils-Polys 85.50 (*)     Lymphocytes 7.60 (*)     Immature Granulocytes 1.00 (*)     Neutrophils (Absolute) 11.58 (*)     Immature Granulocytes (abs) 0.13 (*)     All other components within normal limits   COMP METABOLIC PANEL - Abnormal; Notable for the following components:    Sodium 132 (*)     Potassium 3.5 (*)     Glucose 134 (*)     Alkaline Phosphatase 128 (*)     Total Protein 9.2 (*)     Globulin 6.0 (*)     All other components within normal limits   COV-2, FLU A/B, AND RSV BY PCR ([employer]) - Abnormal; Notable for the following components:    Influenza virus A RNA POSITIVE (*)     All other components within normal limits   CRP QUANTITIVE (NON-CARDIAC) - Abnormal; Notable for the following components:    Stat C-Reactive Protein 23.81 (*)     All other components within normal limits   LACTIC ACID - Abnormal; Notable for the following components:    Lactic Acid 2.4 (*)     All other components within normal limits   PROCALCITONIN -  "Abnormal; Notable for the following components:    Procalcitonin 0.50 (*)     All other components within normal limits   LACTIC ACID   LIPASE   TROPONIN   ESTIMATED GFR   BLOOD CULTURE    Narrative:     1 of 2 for Blood Culture x 2 sites order. Per Hospital  Policy: Only change Specimen Src: to \"Line\" if specified by  physician order.   BLOOD CULTURE    Narrative:     2 of 2 blood culture x2  Sites order. Per Hospital Policy:  Only change Specimen Src: to \"Line\" if specified by physician  order.     RADIOLOGY  CT-ABDOMEN-PELVIS WITH   Final Result      1.  Opacifications are noted in each lung base as described above. Findings could be due to pneumonitis or pneumonia.      2.  Trace pericholecystic fluid is noted.      3.  Biliary ductal dilatation is identified.      DX-CHEST-PORTABLE (1 VIEW)   Final Result         1.  Pulmonary opacifications are noted in the lung bases, greater on the left side. Findings could be due to pneumonia. Aspiration pneumonia is possible. Pulmonary edema is also possible.        COURSE & MEDICAL DECISION MAKING  Pertinent Labs & Imaging studies reviewed. (See chart for details)    DDX:Infection: r/o pneumonia, pyelonephritis, influenza, viral syndrome, soft tissue infection, intra-abdominal infection, lactic acidosis, dehydration     MDM  Initial evaluation at 2109:  Patient seen and evaluated for symptoms as described above.  She is alert, oriented and has a history as described above that is concerning for the possible disseminated infection.  She has multiple areas of concern for abdominal abscess on the anterior wall of the abdomen.  There is likely a area of IV injection as well and while the patient does not have acute new murmur nor she tachycardic she did have febrile illness and tachycardia in triage.  She meets septic criteria and is broad-spectrum antibiotics ordered in addition to cultures and fluid resuscitation per sepsis protocol.    Patient is a leukocytosis, CRP " "greater than 20, procalcitonin is also elevated lactate 2.4.  There is no gross LFT changes, there is no gross electrolyte derangements, COVID testing is currently pending and the patient is receiving IV fluids and tolerating this well.  While underlying HIV and immunosuppression is a consideration she is states that she continues to take her daily heart therapy and is fairly sure that she had a reassuring CD4 count within the last several months that did not require prophylactic antibiotic administrations.  She has established with [facility] and seems reliable with this.    CT abdomen pelvis with contrast shows no evidence of focal infectious process in the abdominal wall, no evidence of abscess, there is no other acute localizing areas beyond opacities in the lung fields as noted in her chest x-ray.    At this time the patient will continue to be fluid resuscitated, she is not having any severe hypotension following fluid resuscitation and will be admitted for sepsis, there is coinfection with influenza and with her subcutaneous heroin administration this is likely the source though she may have her vital sign abnormalities and initial septic etiology from her influenza.  She has already received broad-spectrum antibiosis and will be admitted to the hospitalist in guarded condition.    I examined the patient 11/26/2022 12:21 AM  Vital Signs:/60   Pulse 79   Temp 36.8 °C (98.2 °F) (Oral)   Resp 13   Ht 1.575 m (5' 2\")   Wt 44.1 kg (97 lb 3.6 oz)   SpO2 98%   BMI 17.78 kg/m²   Cardiac examination significant for Regular rate and rhythm  Pulmonary examination significant for Clear lung fileds  Capillary refill is brisk  Peripheral Pulse is 2+   Skin is normal    CRITICAL CARE:  I saw and evaluated this patient. I personally provided  38 minutes  of critical care time to the patient excluding billable procedures and directly and personally provided the following treatment and critical care " management:  Critical Care Interventions  Multiple bedside assessments, coordination of care with family and other historical sources, Continuous hemodynamic and respiratory monitoring, Serial neurologic exams, Serial airway observations, and Fluid resuscitation    HYDRATION: Based on the patient's presentation of Sepsis, Tachycardia, and Other Influenza, NPO status the patient was given IV fluids. IV Hydration was used because oral hydration was not adequate alone. Upon recheck following hydration, the patient was improved.    FINAL IMPRESSION  Visit Diagnoses     ICD-10-CM   1. Sepsis without acute organ dysfunction, due to unspecified organism (HCC)  A41.9   2. Pneumonia of left lower lobe due to infectious organism  J18.9   3. Generalized abdominal pain  R10.84   4. Influenza A  J10.1     Electronically signed by: Fuad Mae M.D., 11/25/2022 9:09 PM

## 2022-11-28 LAB
BACTERIA SPEC RESP CULT: NORMAL
GRAM STN SPEC: NORMAL
SIGNIFICANT IND 70042: NORMAL
SITE SITE: NORMAL
SOURCE SOURCE: NORMAL

## 2022-11-30 LAB
BACTERIA BLD CULT: NORMAL
BACTERIA BLD CULT: NORMAL
SIGNIFICANT IND 70042: NORMAL
SIGNIFICANT IND 70042: NORMAL
SITE SITE: NORMAL
SITE SITE: NORMAL
SOURCE SOURCE: NORMAL
SOURCE SOURCE: NORMAL

## 2022-12-20 LAB
GRAM STN SPEC: NORMAL
SIGNIFICANT IND 70042: NORMAL
SITE SITE: NORMAL
SOURCE SOURCE: NORMAL

## 2023-01-14 ENCOUNTER — HOSPITAL ENCOUNTER (INPATIENT)
Facility: MEDICAL CENTER | Age: 63
LOS: 1 days | DRG: 974 | End: 2023-01-15
Attending: EMERGENCY MEDICINE | Admitting: STUDENT IN AN ORGANIZED HEALTH CARE EDUCATION/TRAINING PROGRAM
Payer: COMMERCIAL

## 2023-01-14 ENCOUNTER — APPOINTMENT (OUTPATIENT)
Dept: RADIOLOGY | Facility: MEDICAL CENTER | Age: 63
DRG: 974 | End: 2023-01-14
Attending: EMERGENCY MEDICINE
Payer: COMMERCIAL

## 2023-01-14 DIAGNOSIS — F19.90 IV DRUG USER: ICD-10-CM

## 2023-01-14 DIAGNOSIS — J18.9 PNEUMONIA OF BOTH LUNGS DUE TO INFECTIOUS ORGANISM, UNSPECIFIED PART OF LUNG: ICD-10-CM

## 2023-01-14 DIAGNOSIS — D64.9 ANEMIA, UNSPECIFIED TYPE: ICD-10-CM

## 2023-01-14 DIAGNOSIS — E87.6 HYPOKALEMIA: ICD-10-CM

## 2023-01-14 DIAGNOSIS — U07.1 COVID-19 VIRUS INFECTION: ICD-10-CM

## 2023-01-14 DIAGNOSIS — A41.9 SEPSIS, DUE TO UNSPECIFIED ORGANISM, UNSPECIFIED WHETHER ACUTE ORGAN DYSFUNCTION PRESENT (HCC): ICD-10-CM

## 2023-01-14 DIAGNOSIS — J96.01 ACUTE HYPOXEMIC RESPIRATORY FAILURE (HCC): ICD-10-CM

## 2023-01-14 PROBLEM — J98.4 PNEUMONITIS: Status: ACTIVE | Noted: 2023-01-14

## 2023-01-14 LAB
ALBUMIN SERPL BCP-MCNC: 3 G/DL (ref 3.2–4.9)
ALBUMIN/GLOB SERPL: 0.5 G/DL
ALP SERPL-CCNC: 130 U/L (ref 30–99)
ALT SERPL-CCNC: 14 U/L (ref 2–50)
ANION GAP SERPL CALC-SCNC: 11 MMOL/L (ref 7–16)
APTT PPP: 41 SEC (ref 24.7–36)
AST SERPL-CCNC: 28 U/L (ref 12–45)
BASOPHILS # BLD AUTO: 0.1 % (ref 0–1.8)
BASOPHILS # BLD: 0.01 K/UL (ref 0–0.12)
BILIRUB SERPL-MCNC: 0.9 MG/DL (ref 0.1–1.5)
BUN SERPL-MCNC: 19 MG/DL (ref 8–22)
CALCIUM ALBUM COR SERPL-MCNC: 9.2 MG/DL (ref 8.5–10.5)
CALCIUM SERPL-MCNC: 8.4 MG/DL (ref 8.5–10.5)
CHLORIDE SERPL-SCNC: 97 MMOL/L (ref 96–112)
CO2 SERPL-SCNC: 23 MMOL/L (ref 20–33)
CREAT SERPL-MCNC: 0.62 MG/DL (ref 0.5–1.4)
EKG IMPRESSION: NORMAL
EOSINOPHIL # BLD AUTO: 0.03 K/UL (ref 0–0.51)
EOSINOPHIL NFR BLD: 0.3 % (ref 0–6.9)
ERYTHROCYTE [DISTWIDTH] IN BLOOD BY AUTOMATED COUNT: 49.5 FL (ref 35.9–50)
FLUAV RNA SPEC QL NAA+PROBE: NEGATIVE
FLUBV RNA SPEC QL NAA+PROBE: NEGATIVE
GFR SERPLBLD CREATININE-BSD FMLA CKD-EPI: 100 ML/MIN/1.73 M 2
GLOBULIN SER CALC-MCNC: 5.8 G/DL (ref 1.9–3.5)
GLUCOSE SERPL-MCNC: 109 MG/DL (ref 65–99)
HCT VFR BLD AUTO: 26.8 % (ref 37–47)
HGB BLD-MCNC: 8.9 G/DL (ref 12–16)
IMM GRANULOCYTES # BLD AUTO: 0.1 K/UL (ref 0–0.11)
IMM GRANULOCYTES NFR BLD AUTO: 0.9 % (ref 0–0.9)
INR PPP: 1.25 (ref 0.87–1.13)
LACTATE SERPL-SCNC: 1.8 MMOL/L (ref 0.5–2)
LIPASE SERPL-CCNC: 18 U/L (ref 11–82)
LYMPHOCYTES # BLD AUTO: 1.06 K/UL (ref 1–4.8)
LYMPHOCYTES NFR BLD: 9 % (ref 22–41)
MCH RBC QN AUTO: 30.7 PG (ref 27–33)
MCHC RBC AUTO-ENTMCNC: 33.2 G/DL (ref 33.6–35)
MCV RBC AUTO: 92.4 FL (ref 81.4–97.8)
MONOCYTES # BLD AUTO: 0.5 K/UL (ref 0–0.85)
MONOCYTES NFR BLD AUTO: 4.3 % (ref 0–13.4)
NEUTROPHILS # BLD AUTO: 10.04 K/UL (ref 2–7.15)
NEUTROPHILS NFR BLD: 85.4 % (ref 44–72)
NRBC # BLD AUTO: 0 K/UL
NRBC BLD-RTO: 0 /100 WBC
NT-PROBNP SERPL IA-MCNC: 498 PG/ML (ref 0–125)
PLATELET # BLD AUTO: 433 K/UL (ref 164–446)
PMV BLD AUTO: 10.6 FL (ref 9–12.9)
POTASSIUM SERPL-SCNC: 3.1 MMOL/L (ref 3.6–5.5)
PROT SERPL-MCNC: 8.8 G/DL (ref 6–8.2)
PROTHROMBIN TIME: 15.5 SEC (ref 12–14.6)
RBC # BLD AUTO: 2.9 M/UL (ref 4.2–5.4)
RSV RNA SPEC QL NAA+PROBE: NEGATIVE
SARS-COV-2 RNA RESP QL NAA+PROBE: DETECTED
SODIUM SERPL-SCNC: 131 MMOL/L (ref 135–145)
SPECIMEN SOURCE: ABNORMAL
TROPONIN T SERPL-MCNC: 7 NG/L (ref 6–19)
WBC # BLD AUTO: 11.7 K/UL (ref 4.8–10.8)

## 2023-01-14 PROCEDURE — 99222 1ST HOSP IP/OBS MODERATE 55: CPT | Performed by: STUDENT IN AN ORGANIZED HEALTH CARE EDUCATION/TRAINING PROGRAM

## 2023-01-14 PROCEDURE — 700101 HCHG RX REV CODE 250: Performed by: EMERGENCY MEDICINE

## 2023-01-14 PROCEDURE — 85025 COMPLETE CBC W/AUTO DIFF WBC: CPT

## 2023-01-14 PROCEDURE — 85610 PROTHROMBIN TIME: CPT

## 2023-01-14 PROCEDURE — C9803 HOPD COVID-19 SPEC COLLECT: HCPCS | Performed by: EMERGENCY MEDICINE

## 2023-01-14 PROCEDURE — 87040 BLOOD CULTURE FOR BACTERIA: CPT | Mod: 91

## 2023-01-14 PROCEDURE — 93005 ELECTROCARDIOGRAM TRACING: CPT | Performed by: EMERGENCY MEDICINE

## 2023-01-14 PROCEDURE — 80053 COMPREHEN METABOLIC PANEL: CPT

## 2023-01-14 PROCEDURE — 83880 ASSAY OF NATRIURETIC PEPTIDE: CPT

## 2023-01-14 PROCEDURE — 96367 TX/PROPH/DG ADDL SEQ IV INF: CPT

## 2023-01-14 PROCEDURE — 700105 HCHG RX REV CODE 258: Performed by: EMERGENCY MEDICINE

## 2023-01-14 PROCEDURE — 71045 X-RAY EXAM CHEST 1 VIEW: CPT

## 2023-01-14 PROCEDURE — 83605 ASSAY OF LACTIC ACID: CPT

## 2023-01-14 PROCEDURE — 700111 HCHG RX REV CODE 636 W/ 250 OVERRIDE (IP): Performed by: EMERGENCY MEDICINE

## 2023-01-14 PROCEDURE — 84484 ASSAY OF TROPONIN QUANT: CPT

## 2023-01-14 PROCEDURE — 96365 THER/PROPH/DIAG IV INF INIT: CPT

## 2023-01-14 PROCEDURE — 96366 THER/PROPH/DIAG IV INF ADDON: CPT

## 2023-01-14 PROCEDURE — 0241U HCHG SARS-COV-2 COVID-19 NFCT DS RESP RNA 4 TRGT MIC: CPT

## 2023-01-14 PROCEDURE — 36415 COLL VENOUS BLD VENIPUNCTURE: CPT

## 2023-01-14 PROCEDURE — 83690 ASSAY OF LIPASE: CPT

## 2023-01-14 PROCEDURE — 99285 EMERGENCY DEPT VISIT HI MDM: CPT

## 2023-01-14 PROCEDURE — 85730 THROMBOPLASTIN TIME PARTIAL: CPT

## 2023-01-14 RX ORDER — PREDNISONE 20 MG/1
40 TABLET ORAL ONCE
Status: DISCONTINUED | OUTPATIENT
Start: 2023-01-14 | End: 2023-01-15 | Stop reason: HOSPADM

## 2023-01-14 RX ORDER — AZITHROMYCIN 500 MG/1
500 INJECTION, POWDER, LYOPHILIZED, FOR SOLUTION INTRAVENOUS ONCE
Status: COMPLETED | OUTPATIENT
Start: 2023-01-14 | End: 2023-01-14

## 2023-01-14 RX ORDER — BISACODYL 10 MG
10 SUPPOSITORY, RECTAL RECTAL
Status: DISCONTINUED | OUTPATIENT
Start: 2023-01-14 | End: 2023-01-15 | Stop reason: HOSPADM

## 2023-01-14 RX ORDER — PREDNISONE 20 MG/1
40 TABLET ORAL 2 TIMES DAILY
Status: DISCONTINUED | OUTPATIENT
Start: 2023-01-15 | End: 2023-01-15 | Stop reason: HOSPADM

## 2023-01-14 RX ORDER — POLYETHYLENE GLYCOL 3350 17 G/17G
1 POWDER, FOR SOLUTION ORAL
Status: DISCONTINUED | OUTPATIENT
Start: 2023-01-14 | End: 2023-01-15 | Stop reason: HOSPADM

## 2023-01-14 RX ORDER — SODIUM CHLORIDE, SODIUM LACTATE, POTASSIUM CHLORIDE, AND CALCIUM CHLORIDE .6; .31; .03; .02 G/100ML; G/100ML; G/100ML; G/100ML
1000 INJECTION, SOLUTION INTRAVENOUS ONCE
Status: COMPLETED | OUTPATIENT
Start: 2023-01-14 | End: 2023-01-14

## 2023-01-14 RX ORDER — ACETAMINOPHEN 325 MG/1
650 TABLET ORAL EVERY 6 HOURS PRN
Status: DISCONTINUED | OUTPATIENT
Start: 2023-01-14 | End: 2023-01-15 | Stop reason: HOSPADM

## 2023-01-14 RX ORDER — ONDANSETRON 2 MG/ML
4 INJECTION INTRAMUSCULAR; INTRAVENOUS EVERY 4 HOURS PRN
Status: DISCONTINUED | OUTPATIENT
Start: 2023-01-14 | End: 2023-01-15 | Stop reason: HOSPADM

## 2023-01-14 RX ORDER — PROMETHAZINE HYDROCHLORIDE 25 MG/1
12.5-25 SUPPOSITORY RECTAL EVERY 4 HOURS PRN
Status: DISCONTINUED | OUTPATIENT
Start: 2023-01-14 | End: 2023-01-15 | Stop reason: HOSPADM

## 2023-01-14 RX ORDER — PROCHLORPERAZINE EDISYLATE 5 MG/ML
5-10 INJECTION INTRAMUSCULAR; INTRAVENOUS EVERY 4 HOURS PRN
Status: DISCONTINUED | OUTPATIENT
Start: 2023-01-14 | End: 2023-01-15 | Stop reason: HOSPADM

## 2023-01-14 RX ORDER — PROMETHAZINE HYDROCHLORIDE 25 MG/1
12.5-25 TABLET ORAL EVERY 4 HOURS PRN
Status: DISCONTINUED | OUTPATIENT
Start: 2023-01-14 | End: 2023-01-15 | Stop reason: HOSPADM

## 2023-01-14 RX ORDER — ONDANSETRON 4 MG/1
4 TABLET, ORALLY DISINTEGRATING ORAL EVERY 4 HOURS PRN
Status: DISCONTINUED | OUTPATIENT
Start: 2023-01-14 | End: 2023-01-15 | Stop reason: HOSPADM

## 2023-01-14 RX ORDER — ENOXAPARIN SODIUM 100 MG/ML
30 INJECTION SUBCUTANEOUS DAILY
Status: DISCONTINUED | OUTPATIENT
Start: 2023-01-15 | End: 2023-01-15 | Stop reason: HOSPADM

## 2023-01-14 RX ORDER — SULFAMETHOXAZOLE AND TRIMETHOPRIM 800; 160 MG/1; MG/1
2 TABLET ORAL EVERY 12 HOURS
Status: DISCONTINUED | OUTPATIENT
Start: 2023-01-15 | End: 2023-01-15 | Stop reason: HOSPADM

## 2023-01-14 RX ORDER — CEFTRIAXONE 2 G/1
2000 INJECTION, POWDER, FOR SOLUTION INTRAMUSCULAR; INTRAVENOUS ONCE
Status: COMPLETED | OUTPATIENT
Start: 2023-01-14 | End: 2023-01-14

## 2023-01-14 RX ORDER — GUAIFENESIN/DEXTROMETHORPHAN 100-10MG/5
10 SYRUP ORAL EVERY 6 HOURS PRN
Status: DISCONTINUED | OUTPATIENT
Start: 2023-01-14 | End: 2023-01-15 | Stop reason: HOSPADM

## 2023-01-14 RX ORDER — AMOXICILLIN 250 MG
2 CAPSULE ORAL 2 TIMES DAILY
Status: DISCONTINUED | OUTPATIENT
Start: 2023-01-14 | End: 2023-01-15 | Stop reason: HOSPADM

## 2023-01-14 RX ORDER — POTASSIUM CHLORIDE 20 MEQ/1
40 TABLET, EXTENDED RELEASE ORAL ONCE
Status: COMPLETED | OUTPATIENT
Start: 2023-01-14 | End: 2023-01-15

## 2023-01-14 RX ADMIN — AZITHROMYCIN MONOHYDRATE 500 MG: 500 INJECTION, POWDER, LYOPHILIZED, FOR SOLUTION INTRAVENOUS at 20:39

## 2023-01-14 RX ADMIN — CEFTRIAXONE SODIUM 2000 MG: 2 INJECTION, POWDER, FOR SOLUTION INTRAMUSCULAR; INTRAVENOUS at 20:34

## 2023-01-14 RX ADMIN — SODIUM CHLORIDE, POTASSIUM CHLORIDE, SODIUM LACTATE AND CALCIUM CHLORIDE 1000 ML: 600; 310; 30; 20 INJECTION, SOLUTION INTRAVENOUS at 20:33

## 2023-01-14 RX ADMIN — SULFAMETHOXAZOLE AND TRIMETHOPRIM 240 MG OF TRIMETHOPRIM: 80; 16 INJECTION, SOLUTION, CONCENTRATE INTRAVENOUS at 22:10

## 2023-01-14 ASSESSMENT — ENCOUNTER SYMPTOMS
SHORTNESS OF BREATH: 1
NAUSEA: 0
FEVER: 1
WHEEZING: 0
DIARRHEA: 0
DEPRESSION: 0
CHILLS: 1
COUGH: 1
SPUTUM PRODUCTION: 1
VOMITING: 0
MYALGIAS: 1
HEADACHES: 0
EYE PAIN: 0
ABDOMINAL PAIN: 0

## 2023-01-14 ASSESSMENT — FIBROSIS 4 INDEX: FIB4 SCORE: 0.83

## 2023-01-15 ENCOUNTER — APPOINTMENT (OUTPATIENT)
Dept: RADIOLOGY | Facility: MEDICAL CENTER | Age: 63
DRG: 974 | End: 2023-01-15
Attending: INTERNAL MEDICINE
Payer: COMMERCIAL

## 2023-01-15 ENCOUNTER — APPOINTMENT (OUTPATIENT)
Dept: RADIOLOGY | Facility: MEDICAL CENTER | Age: 63
DRG: 974 | End: 2023-01-15
Payer: COMMERCIAL

## 2023-01-15 ENCOUNTER — HOSPITAL ENCOUNTER (INPATIENT)
Facility: MEDICAL CENTER | Age: 63
LOS: 2 days | DRG: 974 | End: 2023-01-17
Attending: EMERGENCY MEDICINE | Admitting: INTERNAL MEDICINE
Payer: COMMERCIAL

## 2023-01-15 ENCOUNTER — APPOINTMENT (OUTPATIENT)
Dept: RADIOLOGY | Facility: MEDICAL CENTER | Age: 63
DRG: 974 | End: 2023-01-15
Attending: HOSPITALIST
Payer: COMMERCIAL

## 2023-01-15 ENCOUNTER — APPOINTMENT (OUTPATIENT)
Dept: RADIOLOGY | Facility: MEDICAL CENTER | Age: 63
DRG: 974 | End: 2023-01-15
Attending: EMERGENCY MEDICINE
Payer: COMMERCIAL

## 2023-01-15 VITALS
HEART RATE: 88 BPM | DIASTOLIC BLOOD PRESSURE: 66 MMHG | TEMPERATURE: 99.4 F | RESPIRATION RATE: 22 BRPM | BODY MASS INDEX: 18.29 KG/M2 | WEIGHT: 100 LBS | OXYGEN SATURATION: 100 % | SYSTOLIC BLOOD PRESSURE: 152 MMHG

## 2023-01-15 DIAGNOSIS — Z91.199 NONCOMPLIANCE: ICD-10-CM

## 2023-01-15 DIAGNOSIS — F15.10 AMPHETAMINE ABUSE (HCC): ICD-10-CM

## 2023-01-15 DIAGNOSIS — J18.9 MULTIFOCAL PNEUMONIA: ICD-10-CM

## 2023-01-15 DIAGNOSIS — B20 HIV INFECTION, UNSPECIFIED SYMPTOM STATUS (HCC): ICD-10-CM

## 2023-01-15 DIAGNOSIS — F11.10 OPIATE ABUSE, CONTINUOUS (HCC): ICD-10-CM

## 2023-01-15 DIAGNOSIS — U07.1 COVID-19: ICD-10-CM

## 2023-01-15 DIAGNOSIS — B20 SYMPTOMATIC HIV INFECTION (HCC): ICD-10-CM

## 2023-01-15 PROBLEM — J12.82 PNEUMONIA DUE TO COVID-19 VIRUS: Status: ACTIVE | Noted: 2023-01-15

## 2023-01-15 LAB
ALBUMIN SERPL BCP-MCNC: 2.7 G/DL (ref 3.2–4.9)
ALBUMIN SERPL BCP-MCNC: 2.7 G/DL (ref 3.2–4.9)
ALBUMIN/GLOB SERPL: 0.5 G/DL
ALBUMIN/GLOB SERPL: 0.5 G/DL
ALP SERPL-CCNC: 118 U/L (ref 30–99)
ALP SERPL-CCNC: 125 U/L (ref 30–99)
ALT SERPL-CCNC: 13 U/L (ref 2–50)
ALT SERPL-CCNC: 7 U/L (ref 2–50)
AMPHET UR QL SCN: POSITIVE
ANION GAP SERPL CALC-SCNC: 12 MMOL/L (ref 7–16)
ANION GAP SERPL CALC-SCNC: 13 MMOL/L (ref 7–16)
APPEARANCE UR: ABNORMAL
AST SERPL-CCNC: 22 U/L (ref 12–45)
AST SERPL-CCNC: 22 U/L (ref 12–45)
BACTERIA #/AREA URNS HPF: ABNORMAL /HPF
BARBITURATES UR QL SCN: NEGATIVE
BASE EXCESS BLDV CALC-SCNC: -2 MMOL/L
BASOPHILS # BLD AUTO: 0.2 % (ref 0–1.8)
BASOPHILS # BLD AUTO: 0.2 % (ref 0–1.8)
BASOPHILS # BLD: 0.02 K/UL (ref 0–0.12)
BASOPHILS # BLD: 0.02 K/UL (ref 0–0.12)
BENZODIAZ UR QL SCN: NEGATIVE
BILIRUB SERPL-MCNC: 0.4 MG/DL (ref 0.1–1.5)
BILIRUB SERPL-MCNC: 0.6 MG/DL (ref 0.1–1.5)
BILIRUB UR QL STRIP.AUTO: NEGATIVE
BODY TEMPERATURE: 36.7 CENTIGRADE
BUN SERPL-MCNC: 13 MG/DL (ref 8–22)
BUN SERPL-MCNC: 15 MG/DL (ref 8–22)
BZE UR QL SCN: NEGATIVE
CALCIUM ALBUM COR SERPL-MCNC: 9.3 MG/DL (ref 8.5–10.5)
CALCIUM ALBUM COR SERPL-MCNC: 9.5 MG/DL (ref 8.5–10.5)
CALCIUM SERPL-MCNC: 8.3 MG/DL (ref 8.5–10.5)
CALCIUM SERPL-MCNC: 8.5 MG/DL (ref 8.5–10.5)
CANNABINOIDS UR QL SCN: NEGATIVE
CHLORIDE SERPL-SCNC: 100 MMOL/L (ref 96–112)
CHLORIDE SERPL-SCNC: 103 MMOL/L (ref 96–112)
CO2 SERPL-SCNC: 23 MMOL/L (ref 20–33)
CO2 SERPL-SCNC: 23 MMOL/L (ref 20–33)
COLOR UR: YELLOW
CREAT SERPL-MCNC: 0.62 MG/DL (ref 0.5–1.4)
CREAT SERPL-MCNC: 0.64 MG/DL (ref 0.5–1.4)
D DIMER PPP IA.FEU-MCNC: 3.62 UG/ML (FEU) (ref 0–0.5)
EKG IMPRESSION: NORMAL
EOSINOPHIL # BLD AUTO: 0 K/UL (ref 0–0.51)
EOSINOPHIL # BLD AUTO: 0.02 K/UL (ref 0–0.51)
EOSINOPHIL NFR BLD: 0 % (ref 0–6.9)
EOSINOPHIL NFR BLD: 0.2 % (ref 0–6.9)
EPI CELLS #/AREA URNS HPF: ABNORMAL /HPF
ERYTHROCYTE [DISTWIDTH] IN BLOOD BY AUTOMATED COUNT: 47.5 FL (ref 35.9–50)
ERYTHROCYTE [DISTWIDTH] IN BLOOD BY AUTOMATED COUNT: 47.5 FL (ref 35.9–50)
GFR SERPLBLD CREATININE-BSD FMLA CKD-EPI: 100 ML/MIN/1.73 M 2
GFR SERPLBLD CREATININE-BSD FMLA CKD-EPI: 99 ML/MIN/1.73 M 2
GLOBULIN SER CALC-MCNC: 5.7 G/DL (ref 1.9–3.5)
GLOBULIN SER CALC-MCNC: 5.7 G/DL (ref 1.9–3.5)
GLUCOSE SERPL-MCNC: 117 MG/DL (ref 65–99)
GLUCOSE SERPL-MCNC: 128 MG/DL (ref 65–99)
GLUCOSE UR STRIP.AUTO-MCNC: NEGATIVE MG/DL
HCO3 BLDV-SCNC: 22 MMOL/L (ref 24–28)
HCT VFR BLD AUTO: 30.1 % (ref 37–47)
HCT VFR BLD AUTO: 30.5 % (ref 37–47)
HGB BLD-MCNC: 9.7 G/DL (ref 12–16)
HGB BLD-MCNC: 9.9 G/DL (ref 12–16)
HYALINE CASTS #/AREA URNS LPF: ABNORMAL /LPF
IMM GRANULOCYTES # BLD AUTO: 0.06 K/UL (ref 0–0.11)
IMM GRANULOCYTES # BLD AUTO: 0.11 K/UL (ref 0–0.11)
IMM GRANULOCYTES NFR BLD AUTO: 0.6 % (ref 0–0.9)
IMM GRANULOCYTES NFR BLD AUTO: 0.9 % (ref 0–0.9)
KETONES UR STRIP.AUTO-MCNC: NEGATIVE MG/DL
LDH SERPL L TO P-CCNC: 187 U/L (ref 107–266)
LEUKOCYTE ESTERASE UR QL STRIP.AUTO: NEGATIVE
LYMPHOCYTES # BLD AUTO: 1.09 K/UL (ref 1–4.8)
LYMPHOCYTES # BLD AUTO: 1.23 K/UL (ref 1–4.8)
LYMPHOCYTES NFR BLD: 10.4 % (ref 22–41)
LYMPHOCYTES NFR BLD: 11.7 % (ref 22–41)
MCH RBC QN AUTO: 28.7 PG (ref 27–33)
MCH RBC QN AUTO: 28.9 PG (ref 27–33)
MCHC RBC AUTO-ENTMCNC: 32.2 G/DL (ref 33.6–35)
MCHC RBC AUTO-ENTMCNC: 32.5 G/DL (ref 33.6–35)
MCV RBC AUTO: 88.9 FL (ref 81.4–97.8)
MCV RBC AUTO: 89.1 FL (ref 81.4–97.8)
METHADONE UR QL SCN: NEGATIVE
MICRO URNS: ABNORMAL
MONOCYTES # BLD AUTO: 0.42 K/UL (ref 0–0.85)
MONOCYTES # BLD AUTO: 0.54 K/UL (ref 0–0.85)
MONOCYTES NFR BLD AUTO: 4.5 % (ref 0–13.4)
MONOCYTES NFR BLD AUTO: 4.6 % (ref 0–13.4)
NEUTROPHILS # BLD AUTO: 7.76 K/UL (ref 2–7.15)
NEUTROPHILS # BLD AUTO: 9.93 K/UL (ref 2–7.15)
NEUTROPHILS NFR BLD: 83 % (ref 44–72)
NEUTROPHILS NFR BLD: 83.7 % (ref 44–72)
NITRITE UR QL STRIP.AUTO: NEGATIVE
NRBC # BLD AUTO: 0 K/UL
NRBC # BLD AUTO: 0 K/UL
NRBC BLD-RTO: 0 /100 WBC
NRBC BLD-RTO: 0 /100 WBC
OPIATES UR QL SCN: POSITIVE
OXYCODONE UR QL SCN: NEGATIVE
PCO2 BLDV: 32 MMHG (ref 41–51)
PCP UR QL SCN: NEGATIVE
PH BLDV: 7.45 [PH] (ref 7.31–7.45)
PH UR STRIP.AUTO: 6.5 [PH] (ref 5–8)
PLATELET # BLD AUTO: 454 K/UL (ref 164–446)
PLATELET # BLD AUTO: 498 K/UL (ref 164–446)
PMV BLD AUTO: 9.7 FL (ref 9–12.9)
PMV BLD AUTO: 9.8 FL (ref 9–12.9)
PO2 BLDV: 44.2 MMHG (ref 25–40)
POTASSIUM SERPL-SCNC: 2.8 MMOL/L (ref 3.6–5.5)
POTASSIUM SERPL-SCNC: 2.9 MMOL/L (ref 3.6–5.5)
PROCALCITONIN SERPL-MCNC: 1.14 NG/ML
PROPOXYPH UR QL SCN: NEGATIVE
PROT SERPL-MCNC: 8.4 G/DL (ref 6–8.2)
PROT SERPL-MCNC: 8.4 G/DL (ref 6–8.2)
PROT UR QL STRIP: 30 MG/DL
RBC # BLD AUTO: 3.38 M/UL (ref 4.2–5.4)
RBC # BLD AUTO: 3.43 M/UL (ref 4.2–5.4)
RBC # URNS HPF: ABNORMAL /HPF
RBC UR QL AUTO: ABNORMAL
SAO2 % BLDV: 77.6 %
SODIUM SERPL-SCNC: 136 MMOL/L (ref 135–145)
SODIUM SERPL-SCNC: 138 MMOL/L (ref 135–145)
SP GR UR STRIP.AUTO: 1.01
UROBILINOGEN UR STRIP.AUTO-MCNC: 1 MG/DL
WBC # BLD AUTO: 11.9 K/UL (ref 4.8–10.8)
WBC # BLD AUTO: 9.4 K/UL (ref 4.8–10.8)
WBC #/AREA URNS HPF: ABNORMAL /HPF

## 2023-01-15 PROCEDURE — 700102 HCHG RX REV CODE 250 W/ 637 OVERRIDE(OP): Performed by: HOSPITALIST

## 2023-01-15 PROCEDURE — 71045 X-RAY EXAM CHEST 1 VIEW: CPT

## 2023-01-15 PROCEDURE — 99233 SBSQ HOSP IP/OBS HIGH 50: CPT | Performed by: HOSPITALIST

## 2023-01-15 PROCEDURE — A9270 NON-COVERED ITEM OR SERVICE: HCPCS | Performed by: EMERGENCY MEDICINE

## 2023-01-15 PROCEDURE — 87536 HIV-1 QUANT&REVRSE TRNSCRPJ: CPT

## 2023-01-15 PROCEDURE — 700102 HCHG RX REV CODE 250 W/ 637 OVERRIDE(OP): Performed by: EMERGENCY MEDICINE

## 2023-01-15 PROCEDURE — 86359 T CELLS TOTAL COUNT: CPT

## 2023-01-15 PROCEDURE — 700111 HCHG RX REV CODE 636 W/ 250 OVERRIDE (IP): Performed by: EMERGENCY MEDICINE

## 2023-01-15 PROCEDURE — 82803 BLOOD GASES ANY COMBINATION: CPT

## 2023-01-15 PROCEDURE — 700102 HCHG RX REV CODE 250 W/ 637 OVERRIDE(OP): Performed by: INTERNAL MEDICINE

## 2023-01-15 PROCEDURE — 36415 COLL VENOUS BLD VENIPUNCTURE: CPT

## 2023-01-15 PROCEDURE — 80053 COMPREHEN METABOLIC PANEL: CPT

## 2023-01-15 PROCEDURE — 80053 COMPREHEN METABOLIC PANEL: CPT | Mod: 91

## 2023-01-15 PROCEDURE — 93005 ELECTROCARDIOGRAM TRACING: CPT | Performed by: EMERGENCY MEDICINE

## 2023-01-15 PROCEDURE — 700105 HCHG RX REV CODE 258: Performed by: EMERGENCY MEDICINE

## 2023-01-15 PROCEDURE — 81001 URINALYSIS AUTO W/SCOPE: CPT

## 2023-01-15 PROCEDURE — 87449 NOS EACH ORGANISM AG IA: CPT

## 2023-01-15 PROCEDURE — 99222 1ST HOSP IP/OBS MODERATE 55: CPT | Performed by: INTERNAL MEDICINE

## 2023-01-15 PROCEDURE — 8E0ZXY6 ISOLATION: ICD-10-PCS | Performed by: HOSPITALIST

## 2023-01-15 PROCEDURE — 85379 FIBRIN DEGRADATION QUANT: CPT

## 2023-01-15 PROCEDURE — 99285 EMERGENCY DEPT VISIT HI MDM: CPT

## 2023-01-15 PROCEDURE — 96365 THER/PROPH/DIAG IV INF INIT: CPT

## 2023-01-15 PROCEDURE — 86360 T CELL ABSOLUTE COUNT/RATIO: CPT | Mod: 91

## 2023-01-15 PROCEDURE — 86360 T CELL ABSOLUTE COUNT/RATIO: CPT

## 2023-01-15 PROCEDURE — A9270 NON-COVERED ITEM OR SERVICE: HCPCS | Performed by: INTERNAL MEDICINE

## 2023-01-15 PROCEDURE — 306637 HCHG MISC ORTHO ITEM RC 0274

## 2023-01-15 PROCEDURE — A9270 NON-COVERED ITEM OR SERVICE: HCPCS | Performed by: STUDENT IN AN ORGANIZED HEALTH CARE EDUCATION/TRAINING PROGRAM

## 2023-01-15 PROCEDURE — 84145 PROCALCITONIN (PCT): CPT

## 2023-01-15 PROCEDURE — 80307 DRUG TEST PRSMV CHEM ANLYZR: CPT

## 2023-01-15 PROCEDURE — 700102 HCHG RX REV CODE 250 W/ 637 OVERRIDE(OP): Performed by: STUDENT IN AN ORGANIZED HEALTH CARE EDUCATION/TRAINING PROGRAM

## 2023-01-15 PROCEDURE — 85025 COMPLETE CBC W/AUTO DIFF WBC: CPT

## 2023-01-15 PROCEDURE — 96375 TX/PRO/DX INJ NEW DRUG ADDON: CPT

## 2023-01-15 PROCEDURE — 83615 LACTATE (LD) (LDH) ENZYME: CPT

## 2023-01-15 PROCEDURE — 87086 URINE CULTURE/COLONY COUNT: CPT

## 2023-01-15 PROCEDURE — A9270 NON-COVERED ITEM OR SERVICE: HCPCS | Performed by: HOSPITALIST

## 2023-01-15 PROCEDURE — 93005 ELECTROCARDIOGRAM TRACING: CPT

## 2023-01-15 PROCEDURE — 770020 HCHG ROOM/CARE - TELE (206)

## 2023-01-15 PROCEDURE — 86359 T CELLS TOTAL COUNT: CPT | Mod: 91

## 2023-01-15 PROCEDURE — 85025 COMPLETE CBC W/AUTO DIFF WBC: CPT | Mod: 91

## 2023-01-15 RX ORDER — SULFAMETHOXAZOLE AND TRIMETHOPRIM 800; 160 MG/1; MG/1
2 TABLET ORAL ONCE
Status: COMPLETED | OUTPATIENT
Start: 2023-01-15 | End: 2023-01-15

## 2023-01-15 RX ORDER — AMOXICILLIN 250 MG
2 CAPSULE ORAL 2 TIMES DAILY
Status: DISCONTINUED | OUTPATIENT
Start: 2023-01-16 | End: 2023-01-17 | Stop reason: HOSPADM

## 2023-01-15 RX ORDER — AZITHROMYCIN 500 MG/5ML
500 INJECTION, POWDER, LYOPHILIZED, FOR SOLUTION INTRAVENOUS ONCE
Status: COMPLETED | OUTPATIENT
Start: 2023-01-15 | End: 2023-01-15

## 2023-01-15 RX ORDER — ENOXAPARIN SODIUM 100 MG/ML
40 INJECTION SUBCUTANEOUS DAILY
Status: DISCONTINUED | OUTPATIENT
Start: 2023-01-16 | End: 2023-01-17 | Stop reason: HOSPADM

## 2023-01-15 RX ORDER — OXYCODONE HYDROCHLORIDE 5 MG/1
5 TABLET ORAL EVERY 6 HOURS PRN
Status: DISCONTINUED | OUTPATIENT
Start: 2023-01-15 | End: 2023-01-17 | Stop reason: HOSPADM

## 2023-01-15 RX ORDER — DEXAMETHASONE 4 MG/1
6 TABLET ORAL DAILY
Status: DISCONTINUED | OUTPATIENT
Start: 2023-01-16 | End: 2023-01-15

## 2023-01-15 RX ORDER — ACETAMINOPHEN 325 MG/1
650 TABLET ORAL EVERY 6 HOURS PRN
Status: DISCONTINUED | OUTPATIENT
Start: 2023-01-15 | End: 2023-01-17 | Stop reason: HOSPADM

## 2023-01-15 RX ORDER — POTASSIUM CHLORIDE 20 MEQ/1
40 TABLET, EXTENDED RELEASE ORAL ONCE
Status: COMPLETED | OUTPATIENT
Start: 2023-01-15 | End: 2023-01-15

## 2023-01-15 RX ORDER — PREDNISONE 20 MG/1
40 TABLET ORAL 2 TIMES DAILY
Status: DISCONTINUED | OUTPATIENT
Start: 2023-01-16 | End: 2023-01-16

## 2023-01-15 RX ORDER — ONDANSETRON 4 MG/1
4 TABLET, ORALLY DISINTEGRATING ORAL EVERY 6 HOURS PRN
Status: DISCONTINUED | OUTPATIENT
Start: 2023-01-15 | End: 2023-01-17 | Stop reason: HOSPADM

## 2023-01-15 RX ORDER — ONDANSETRON 2 MG/ML
4 INJECTION INTRAMUSCULAR; INTRAVENOUS EVERY 6 HOURS PRN
Status: DISCONTINUED | OUTPATIENT
Start: 2023-01-15 | End: 2023-01-17 | Stop reason: HOSPADM

## 2023-01-15 RX ORDER — POTASSIUM CHLORIDE 20 MEQ/1
40 TABLET, EXTENDED RELEASE ORAL 2 TIMES DAILY
Status: COMPLETED | OUTPATIENT
Start: 2023-01-15 | End: 2023-01-16

## 2023-01-15 RX ORDER — SULFAMETHOXAZOLE AND TRIMETHOPRIM 800; 160 MG/1; MG/1
2 TABLET ORAL EVERY 12 HOURS
Status: DISCONTINUED | OUTPATIENT
Start: 2023-01-16 | End: 2023-01-16

## 2023-01-15 RX ORDER — POLYETHYLENE GLYCOL 3350 17 G/17G
1 POWDER, FOR SOLUTION ORAL
Status: DISCONTINUED | OUTPATIENT
Start: 2023-01-15 | End: 2023-01-17 | Stop reason: HOSPADM

## 2023-01-15 RX ORDER — DEXAMETHASONE SODIUM PHOSPHATE 10 MG/ML
10 INJECTION, SOLUTION INTRAMUSCULAR; INTRAVENOUS ONCE
Status: COMPLETED | OUTPATIENT
Start: 2023-01-15 | End: 2023-01-15

## 2023-01-15 RX ORDER — OXYCODONE HYDROCHLORIDE 5 MG/1
5 TABLET ORAL EVERY 6 HOURS PRN
Status: DISCONTINUED | OUTPATIENT
Start: 2023-01-15 | End: 2023-01-15

## 2023-01-15 RX ORDER — OXYCODONE HYDROCHLORIDE 5 MG/1
5 TABLET ORAL EVERY 4 HOURS PRN
Status: DISCONTINUED | OUTPATIENT
Start: 2023-01-15 | End: 2023-01-15 | Stop reason: HOSPADM

## 2023-01-15 RX ORDER — SODIUM CHLORIDE, SODIUM LACTATE, POTASSIUM CHLORIDE, CALCIUM CHLORIDE 600; 310; 30; 20 MG/100ML; MG/100ML; MG/100ML; MG/100ML
1000 INJECTION, SOLUTION INTRAVENOUS ONCE
Status: COMPLETED | OUTPATIENT
Start: 2023-01-15 | End: 2023-01-16

## 2023-01-15 RX ORDER — BISACODYL 10 MG
10 SUPPOSITORY, RECTAL RECTAL
Status: DISCONTINUED | OUTPATIENT
Start: 2023-01-15 | End: 2023-01-17 | Stop reason: HOSPADM

## 2023-01-15 RX ADMIN — POTASSIUM CHLORIDE 40 MEQ: 1500 TABLET, EXTENDED RELEASE ORAL at 07:45

## 2023-01-15 RX ADMIN — POTASSIUM CHLORIDE 40 MEQ: 20 TABLET, EXTENDED RELEASE ORAL at 14:30

## 2023-01-15 RX ADMIN — DEXAMETHASONE SODIUM PHOSPHATE 10 MG: 10 INJECTION INTRAMUSCULAR; INTRAVENOUS at 20:37

## 2023-01-15 RX ADMIN — ACETAMINOPHEN 650 MG: 325 TABLET ORAL at 07:51

## 2023-01-15 RX ADMIN — AZITHROMYCIN FOR INJECTION INJECTION, POWDER, LYOPHILIZED, FOR SOLUTION 500 MG: 500 INJECTION INTRAVENOUS at 20:30

## 2023-01-15 RX ADMIN — ACETAMINOPHEN 650 MG: 325 TABLET ORAL at 13:41

## 2023-01-15 RX ADMIN — SODIUM CHLORIDE, POTASSIUM CHLORIDE, SODIUM LACTATE AND CALCIUM CHLORIDE 1000 ML: 600; 310; 30; 20 INJECTION, SOLUTION INTRAVENOUS at 20:37

## 2023-01-15 RX ADMIN — CEFTRIAXONE SODIUM 2000 MG: 10 INJECTION, POWDER, FOR SOLUTION INTRAVENOUS at 20:37

## 2023-01-15 RX ADMIN — SULFAMETHOXAZOLE AND TRIMETHOPRIM 2 TABLET: 800; 160 TABLET ORAL at 20:38

## 2023-01-15 RX ADMIN — OXYCODONE HYDROCHLORIDE 5 MG: 5 TABLET ORAL at 13:41

## 2023-01-15 RX ADMIN — POTASSIUM CHLORIDE 40 MEQ: 20 TABLET, EXTENDED RELEASE ORAL at 20:37

## 2023-01-15 RX ADMIN — OXYCODONE HYDROCHLORIDE 5 MG: 5 TABLET ORAL at 22:15

## 2023-01-15 ASSESSMENT — ENCOUNTER SYMPTOMS
NAUSEA: 0
HEARTBURN: 0
SHORTNESS OF BREATH: 1
COUGH: 1
WEIGHT LOSS: 1
BRUISES/BLEEDS EASILY: 0
SINUS PAIN: 0
DEPRESSION: 0
WEAKNESS: 1
WEIGHT LOSS: 1
HEADACHES: 0
VOMITING: 0
DIZZINESS: 0
BLURRED VISION: 0
DOUBLE VISION: 0
WEAKNESS: 1
CHILLS: 0
CLAUDICATION: 0
DOUBLE VISION: 0
HEARTBURN: 0
WHEEZING: 0
BRUISES/BLEEDS EASILY: 0
NAUSEA: 0
HEADACHES: 0
BLURRED VISION: 0
BACK PAIN: 0
SHORTNESS OF BREATH: 1
DEPRESSION: 0
HEMOPTYSIS: 0
HEMOPTYSIS: 0
SINUS PAIN: 0
COUGH: 1
DIZZINESS: 0
MYALGIAS: 0
FEVER: 0
BACK PAIN: 0
VOMITING: 0
FEVER: 0
MYALGIAS: 0
PALPITATIONS: 0
PND: 0
PALPITATIONS: 0
CHILLS: 0
WHEEZING: 0

## 2023-01-15 ASSESSMENT — LIFESTYLE VARIABLES
EVER HAD A DRINK FIRST THING IN THE MORNING TO STEADY YOUR NERVES TO GET RID OF A HANGOVER: NO
HAVE YOU EVER FELT YOU SHOULD CUT DOWN ON YOUR DRINKING: NO
SUBSTANCE_ABUSE: 0
AVERAGE NUMBER OF DAYS PER WEEK YOU HAVE A DRINK CONTAINING ALCOHOL: 0
TOTAL SCORE: 0
ON A TYPICAL DAY WHEN YOU DRINK ALCOHOL HOW MANY DRINKS DO YOU HAVE: 0
HAVE PEOPLE ANNOYED YOU BY CRITICIZING YOUR DRINKING: NO
SUBSTANCE_ABUSE: 0
CONSUMPTION TOTAL: NEGATIVE
HOW MANY TIMES IN THE PAST YEAR HAVE YOU HAD 5 OR MORE DRINKS IN A DAY: 0
EVER FELT BAD OR GUILTY ABOUT YOUR DRINKING: NO
TOTAL SCORE: 0
ALCOHOL_USE: NO
TOTAL SCORE: 0

## 2023-01-15 ASSESSMENT — PATIENT HEALTH QUESTIONNAIRE - PHQ9
SUM OF ALL RESPONSES TO PHQ9 QUESTIONS 1 AND 2: 6
4. FEELING TIRED OR HAVING LITTLE ENERGY: NEARLY EVERY DAY
3. TROUBLE FALLING OR STAYING ASLEEP OR SLEEPING TOO MUCH: NEARLY EVERY DAY
9. THOUGHTS THAT YOU WOULD BE BETTER OFF DEAD, OR OF HURTING YOURSELF: NOT AT ALL
5. POOR APPETITE OR OVEREATING: NOT AT ALL
1. LITTLE INTEREST OR PLEASURE IN DOING THINGS: NEARLY EVERY DAY
SUM OF ALL RESPONSES TO PHQ QUESTIONS 1-9: 20
6. FEELING BAD ABOUT YOURSELF - OR THAT YOU ARE A FAILURE OR HAVE LET YOURSELF OR YOUR FAMILY DOWN: NEARLY EVERY DAY
2. FEELING DOWN, DEPRESSED, IRRITABLE, OR HOPELESS: NEARLY EVERY DAY
7. TROUBLE CONCENTRATING ON THINGS, SUCH AS READING THE NEWSPAPER OR WATCHING TELEVISION: NEARLY EVERY DAY
8. MOVING OR SPEAKING SO SLOWLY THAT OTHER PEOPLE COULD HAVE NOTICED. OR THE OPPOSITE, BEING SO FIGETY OR RESTLESS THAT YOU HAVE BEEN MOVING AROUND A LOT MORE THAN USUAL: MORE THAN HALF THE DAYS

## 2023-01-15 ASSESSMENT — PAIN DESCRIPTION - PAIN TYPE: TYPE: ACUTE PAIN

## 2023-01-15 ASSESSMENT — FIBROSIS 4 INDEX: FIB4 SCORE: 1.15

## 2023-01-15 NOTE — ED NOTES
Pt asleep with even unlabored respirations, on monitor, VSS, repositioning self independently. In NAD.

## 2023-01-15 NOTE — ED PROVIDER NOTES
"ED Provider Note    CHIEF COMPLAINT  Chief Complaint   Patient presents with    Cough     Productive cough, generalized weakness and malaise for 2 weeks, hx of untreated HIV and drug use. Oriented x 4.        EXTERNAL RECORDS REVIEWED  Inpatient Notes Discharge summary    HPI/ROS  LIMITATION TO HISTORY   Select: : None  OUTSIDE HISTORIAN(S):  None    Karla Baker is a 62 y.o. female who presents to the emergency department for evaluation of a cough.  The patient states that she has had a cough for 2 to 3 weeks.  She is coughing up clear white sputum.  She states that she has had subjective fevers as well.  She states that over the last couple of days she has been getting progressively weak and has been unable to ambulate secondary to generalized weakness.  She has also had vomiting and has vomited 7 times today.  The patient also admits to chest pain.  She does have a history of HIV but states that she has not been following with the [facility] clinic because she has been \"too sick\".  She has not been taking any medications and does not know what her CD4 count or viral load is.  She was hospitalized here in November 2022 for a pneumonia and influenza.  She does also have a history of IV drug use specifically heroin.  She states that she last used about 5 days ago.    PAST MEDICAL HISTORY  None    SURGICAL HISTORY  patient denies any surgical history    FAMILY HISTORY  No family history on file.    SOCIAL HISTORY  Social History     Tobacco Use    Smoking status: Every Day     Types: Cigarettes    Smokeless tobacco: Never   Vaping Use    Vaping Use: Never used   Substance and Sexual Activity    Alcohol use: Not Currently    Drug use: Yes     Comment: heroin    Sexual activity: Not on file       CURRENT MEDICATIONS  Home Medications       Reviewed by Grace Mahmood (Pharmacy Tech) on 01/14/23 at 2045  Med List Status: Complete     Medication Last Dose Status        Patient Patrice Taking any Medications        "                    ALLERGIES  No Known Allergies    PHYSICAL EXAM  VITAL SIGNS: /55   Pulse 99   Temp 37.4 °C (99.4 °F) (Oral)   Resp 18   Wt 45.4 kg (100 lb)   SpO2 93%   BMI 18.29 kg/m²   Constitutional: Cachectic and ill-appearing.  HENT: Normocephalic atraumatic. Bilateral external ears normal. Bilateral TM's clear. Nose normal. Mucous membranes are dry.  Eyes: Pupils are equal and reactive. Conjunctiva normal. Non-icteric sclera.   Neck: Normal range of motion without tenderness. Supple. No meningeal signs.  Cardiovascular: Tachycardic rate and regular rhythm. No murmurs, gallops or rubs.  Thorax & Lungs: No retractions, nasal flaring, or tachypnea. Breath sounds are clear to auscultation bilaterally. No wheezing, rhonchi or rales.  Abdomen: Soft, nontender and nondistended. No hepatosplenomegaly.  Skin: Warm and dry. No rashes are noted.  Multiple track marks noted over the abdomen.  Extremities: 2+ peripheral pulses. Cap refill is less than 2 seconds. No edema, cyanosis, or clubbing.  Musculoskeletal: Good range of motion in all major joints. No tenderness to palpation or major deformities noted.   Neurologic: Alert and appropriate for age. The patient moves all 4 extremities without obvious deficits.    DIAGNOSTIC STUDIES / PROCEDURES  EKG  I have independently interpreted this EKG  Results for orders placed or performed during the hospital encounter of 23   EKG   Result Value Ref Range    Report       [facility] Emergency Dept.    Test Date:  2023  Pt Name:    TJ PEDROZA              Department: ER  MRN:        [id number]                      Room:       [facility]  Gender:     Female                       Technician: [id number]  :        1960                   Requested By:AMARILIS CASH  Order #:    [id number]                    Reading MD: Amarilis Cash    Measurements  Intervals                                Axis  Rate:       91                           P:           -46  AR:         122                          QRS:        1  QRSD:       84                           T:          41  QT:         382  QTc:        471    Interpretive Statements  Sinus rhythm  Compared to ECG 11/25/2022 20:11:58  Ectopic atrial rhythm now present  Sinus tachycardia no longer present  Electronically Signed On 1- 21:26:07 PST by Amarilis Clemons       LABS  Results for orders placed or performed during the hospital encounter of 01/14/23   CBC With Differential   Result Value Ref Range    WBC 11.7 (H) 4.8 - 10.8 K/uL    RBC 2.90 (L) 4.20 - 5.40 M/uL    Hemoglobin 8.9 (L) 12.0 - 16.0 g/dL    Hematocrit 26.8 (L) 37.0 - 47.0 %    MCV 92.4 81.4 - 97.8 fL    MCH 30.7 27.0 - 33.0 pg    MCHC 33.2 (L) 33.6 - 35.0 g/dL    RDW 49.5 35.9 - 50.0 fL    Platelet Count 433 164 - 446 K/uL    MPV 10.6 9.0 - 12.9 fL    Neutrophils-Polys 85.40 (H) 44.00 - 72.00 %    Lymphocytes 9.00 (L) 22.00 - 41.00 %    Monocytes 4.30 0.00 - 13.40 %    Eosinophils 0.30 0.00 - 6.90 %    Basophils 0.10 0.00 - 1.80 %    Immature Granulocytes 0.90 0.00 - 0.90 %    Nucleated RBC 0.00 /100 WBC    Neutrophils (Absolute) 10.04 (H) 2.00 - 7.15 K/uL    Lymphs (Absolute) 1.06 1.00 - 4.80 K/uL    Monos (Absolute) 0.50 0.00 - 0.85 K/uL    Eos (Absolute) 0.03 0.00 - 0.51 K/uL    Baso (Absolute) 0.01 0.00 - 0.12 K/uL    Immature Granulocytes (abs) 0.10 0.00 - 0.11 K/uL    NRBC (Absolute) 0.00 K/uL   Comp Metabolic Panel   Result Value Ref Range    Sodium 131 (L) 135 - 145 mmol/L    Potassium 3.1 (L) 3.6 - 5.5 mmol/L    Chloride 97 96 - 112 mmol/L    Co2 23 20 - 33 mmol/L    Anion Gap 11.0 7.0 - 16.0    Glucose 109 (H) 65 - 99 mg/dL    Bun 19 8 - 22 mg/dL    Creatinine 0.62 0.50 - 1.40 mg/dL    Calcium 8.4 (L) 8.5 - 10.5 mg/dL    AST(SGOT) 28 12 - 45 U/L    ALT(SGPT) 14 2 - 50 U/L    Alkaline Phosphatase 130 (H) 30 - 99 U/L    Total Bilirubin 0.9 0.1 - 1.5 mg/dL    Albumin 3.0 (L) 3.2 - 4.9 g/dL    Total Protein 8.8 (H) 6.0 - 8.2 g/dL    Globulin 5.8  (H) 1.9 - 3.5 g/dL    A-G Ratio 0.5 g/dL   Lipase   Result Value Ref Range    Lipase 18 11 - 82 U/L   Lactic Acid   Result Value Ref Range    Lactic Acid 1.8 0.5 - 2.0 mmol/L   COV-2, FLU A/B, AND RSV BY PCR (2-4 HOURS CEPHEID): Collect NP swab in VTM    Specimen: Respirate   Result Value Ref Range    Influenza virus A RNA Negative Negative    Influenza virus B, PCR Negative Negative    RSV, PCR Negative Negative    SARS-CoV-2 by PCR DETECTED (AA)     SARS-CoV-2 Source NP Swab    Troponin   Result Value Ref Range    Troponin T 7 6 - 19 ng/L   proBrain Natriuretic Peptide, NT   Result Value Ref Range    NT-proBNP 498 (H) 0 - 125 pg/mL   Prothrombin Time   Result Value Ref Range    PT 15.5 (H) 12.0 - 14.6 sec    INR 1.25 (H) 0.87 - 1.13   APTT   Result Value Ref Range    APTT 41.0 (H) 24.7 - 36.0 sec   ESTIMATED GFR   Result Value Ref Range    GFR (CKD-EPI) 100 >60 mL/min/1.73 m 2   CORRECTED CALCIUM   Result Value Ref Range    Correct Calcium 9.2 8.5 - 10.5 mg/dL   EKG   Result Value Ref Range    Report       [facility] Emergency Dept.    Test Date:  2023  Pt Name:    TJ PEDROZA              Department: ER  MRN:        [id number]                      Room:       [facility]  Gender:     Female                       Technician: [id number]  :        1960                   Requested By:AMARILIS CASH  Order #:    [id number]                    Reading MD: Amarilis Cash    Measurements  Intervals                                Axis  Rate:       91                           P:          -46  NJ:         122                          QRS:        1  QRSD:       84                           T:          41  QT:         382  QTc:        471    Interpretive Statements  Sinus rhythm  Compared to ECG 2022 20:11:58  Ectopic atrial rhythm now present  Sinus tachycardia no longer present  Electronically Signed On 2023 21:26:07 PST by Amarilis Cash       RADIOLOGY  I have independently interpreted the diagnostic  imaging associated with this visit and am waiting the final reading from the radiologist.   DX-CHEST-PORTABLE (1 VIEW)   Final Result      Findings suspicious for multifocal pneumonitis.        COURSE & MEDICAL DECISION MAKING    ED Observation Status? No; Patient does not meet criteria for ED Observation.     INITIAL ASSESSMENT AND PLAN  Care Narrative: This is a 62-year-old female with a history of HIV that has essentially been untreated over the last couple of months presenting to the emergency department for evaluation of a cough and generalized weakness.  On initial evaluation, she was quite ill-appearing and tachycardic with a heart rate of 101.  Her blood pressure was stable at 113/53, but the sepsis protocol was ordered given her clinical appearance.    8:19 PM - The patient had initially been given ceftriaxone and azithromycin to cover for commune acquired pneumonia.  Chest x-ray was concerning for multifocal pneumonitis and given her history of HIV the plan was made to cover for PCP.  She was given steroids and Bactrim in addition to the previous antibiotics.    White count was elevated at 11.7.  Troponin was normal and EKG did not show any evidence of acute ischemia.  I suspect her shortness of breath is secondary to the obvious pneumonia as noted on chest x-ray.  Blood cultures are pending.  Electrolyte's were notable for hypokalemia of 3.1.  This was orally repleted in the emergency department.  Plan was made to admit for antibiotics and fluids.    Additionally, she did desat to 88% and was placed on 2 L of oxygen via nasal cannula.    9:27 PM - I discussed the case with Dr Delacruz, hospitalist.  She agreed with the plan and accepted the patient.    Viral panel was positive for Covid which could also be contributing to her symptoms; however, given her history of HIV, I think that she still requires antibiotics especially given the appearance of her chest xray.     CRITICAL CARE NOTE:  Critical care time was  provided for 35 minutes exclusive of separately billable procedures and treating other patients. This involved direct bedside patient care, speaking with family members, review of past medical records, reviewing the results of the laboratory and diagnostic studies, consulting with other physicians, as well as evaluating the effectiveness of the therapy instituted as described.    ADDITIONAL PROBLEM LIST AND DISPOSITION    Problem #1: Sepsis  Problem #2: Pneumonia  Problem #3: Acute hypoxemic respiratory failure    I have discussed management of the patient with the following physicians and TERRENCE's:  Dr Delacruz, hospitalist.     Discussion of management with other [facility] or appropriate source(s): None     Escalation of care considered, and ultimately not performed:IV fluids, blood analysis, and diagnostic imaging    Barriers to care at this time, including but not limited to: Patient does not have established PCP, Patient is homeless, and Patient had difficult affording medications.     Decision tools and prescription drugs considered including, but not limited to: Antibiotics   .    HYDRATION: Based on the patient's presentation of Acute Vomiting, Dehydration, and Sepsis the patient was given IV fluids. IV Hydration was used because oral hydration was not adequate alone. Upon recheck following hydration, the patient was improved.        FINAL IMPRESSION  1. Sepsis, due to unspecified organism, unspecified whether acute organ dysfunction present (HCC)    2. Pneumonia of both lungs due to infectious organism, unspecified part of lung    3. Acute hypoxemic respiratory failure (HCC)    4. Anemia, unspecified type    5. IV drug user    6. Hypokalemia    7. COVID-19 virus infection      -ADMIT-    Electronically signed by: Amarilis Clemons D.O., 1/14/2023 7:26 PM

## 2023-01-15 NOTE — ED NOTES
Pt does not want to take PO meds., states she cant swallow. Immediately closed eyes and fell back to sleep.

## 2023-01-15 NOTE — HOSPITAL COURSE
Karla Baker is a 62 y.o. female with hx of HIV (followed with hospitals), heroin use (subcu and iv), who presented 1/14/2023 with cough and shortness of breath, subjective fever and chills, body soreness.  Patient reported worsening productive cough and shortness of breath for 3 weeks.  Chest x-ray reviewed and consistent with multifocal pneumonitis.      Patient was hospitalized 11/25-11/26 and was treated for possible PCP pneumonia. CT scan showed bilateral opacities.  However, patient left AMA. CD4 count, HIV viral load, were ordered but not done.  Patient used to follow-up with [facility] for HIV treatment.  However, she ran out of the medication for a few months.  She is currently homeless.      At the ER, patient requires 2 L NC to keep SaO2 93%.   Sodium 133, potassium 3.1, proBNP 498, WBC 11.7, Hgb 8.9,

## 2023-01-15 NOTE — ASSESSMENT & PLAN NOTE
covid 19 pneumonia  supportive treatment  steroids  o2 per protocol     ddimer elevated checking CTA chest  lower ext pain will get us lower ext.

## 2023-01-15 NOTE — PROGRESS NOTES
Patient leaving AMA.   I was called by nurse staff patient wants to leave AMA, I came down to emergency room to patient's room yellow 65, patient is sitting at the edge of the bed, she is already dressed in her regular clothes, patient stated that she wanted to leave, when I ask why is she said that she was having pain in her legs and she was to talk with her kids and that her kids do not know that she is here in the hospital, patient is alert and oriented she is able to tell me her name tell me that she is in the ER hospital at [facility], she is oriented in time, I had long discussion with patient around 25 minutes regarding patient's condition, patient expressed understanding that she has bilateral pneumonia, she has COVID-19 pneumonia, she has not been taking her HIV medication for long time and probably has PCP pneumonia patient is on IV and oral antibiotics, her D-dimer is elevated and I discussed with patient that she could have a blood clot in her lungs or in her legs that will require blood thinner medications and that I have order ultrasound lower extremity and also a CT scan of her chest to look into these, I discussed with patient that  can help her getting in contact with her kids, patient said that she does not have her kids contact information with her and that is why she wants to go home, I told her that  can help her with that, then she said that she was tired of being in this small room in the ER I told her that we will work on getting her up to her room and she will be more comfortable, I also offered her pain medication and also antianxiety medication but patient refused, patient stand up and said that she really wants to go, again explained everything to patient regarding her oxygen requirements her bilateral pneumonia her elevated D-dimer and possible PE/DVT she is positive for COVID-19 she is a very high risk of dying if she leaves hospital explained this in  multiple occasions to patient, she expressed understanding but she still wants to leave AGAINST MEDICAL ADVICE, patient to the best of my knowledge she is able to make her own decisions at this time, she refused any suicidal or homicidal ideation.  At this time patient is going to get her IV removed and she is going to leave AGAINST MEDICAL ADVICE, despite of being advised to stay multiple occasions she refused to stay in the hospital.

## 2023-01-15 NOTE — H&P
Hospital Medicine History & Physical Note    Date of Service  1/14/2023    Primary Care Physician  Stoney Linder D.O.    Consultants      Code Status  Full Code    Chief Complaint  Chief Complaint   Patient presents with    Cough     Productive cough, generalized weakness and malaise for 2 weeks, hx of untreated HIV and drug use. Oriented x 4.        History of Presenting Illness  Karla Bkaer is a 62 y.o. female with hx of HIV (followed with [facility]), heroin use (subcu and iv), who presented 1/14/2023 with cough and shortness of breath, subjective fever and chills, body soreness.  Patient reported worsening productive cough and shortness of breath for 3 weeks.  Chest x-ray reviewed and consistent with multifocal pneumonitis.     Patient was hospitalized 11/25-11/26 and was treated for possible PCP pneumonia. CT scan showed bilateral opacities.  However, patient left AMA. CD4 count, HIV viral load, were ordered but not done.  Patient used to follow-up with [facility] for HIV treatment.  However, she ran out of the medication for a few months.  She is currently homeless.     At the ER, patient requires 2 L NC to keep SaO2 93%.   Sodium 133, potassium 3.1, proBNP 498, WBC 11.7, Hgb 8.9,     I discussed the plan of care with patient and ERP .    Review of Systems  Review of Systems   Constitutional:  Positive for chills, fever and malaise/fatigue.   HENT:  Negative for ear pain.    Eyes:  Negative for pain.   Respiratory:  Positive for cough, sputum production and shortness of breath. Negative for wheezing.    Cardiovascular:  Negative for chest pain and leg swelling.   Gastrointestinal:  Negative for abdominal pain, diarrhea, nausea and vomiting.   Genitourinary:  Negative for dysuria and urgency.   Musculoskeletal:  Positive for myalgias.   Neurological:  Negative for headaches.   Psychiatric/Behavioral:  Negative for depression.      Past Medical History   has no past medical history on file.    Surgical  History   has no past surgical history on file.     Family History  family history is not on file.   Family history reviewed with patient. There is no family history that is pertinent to the chief complaint.     Social History   reports that she has been smoking cigarettes. She has never used smokeless tobacco. She reports that she does not currently use alcohol. She reports current drug use.    Allergies  No Known Allergies    Medications  None       Physical Exam  Temp:  [37.4 °C (99.4 °F)] 37.4 °C (99.4 °F)  Pulse:  [] 99  Resp:  [18-20] 18  BP: (113-115)/(53-55) 115/55  SpO2:  [92 %-93 %] 93 %  Blood Pressure: 113/53   Temperature: 37.4 °C (99.4 °F)   Pulse: (!) 101   Respiration: 20   Pulse Oximetry: 92 %       Physical Exam  Vitals and nursing note reviewed.   Constitutional:       Appearance: She is cachectic. She is ill-appearing.   HENT:      Head: Normocephalic.      Nose: Nose normal.      Mouth/Throat:      Mouth: Mucous membranes are dry.   Cardiovascular:      Rate and Rhythm: Normal rate and regular rhythm.      Pulses: Normal pulses.      Heart sounds: Normal heart sounds.   Pulmonary:      Effort: Pulmonary effort is normal.      Breath sounds: Rales present. No wheezing.   Abdominal:      General: Bowel sounds are normal.      Palpations: Abdomen is soft.      Tenderness: There is no abdominal tenderness.   Musculoskeletal:         General: No swelling or tenderness. Normal range of motion.      Cervical back: Normal range of motion and neck supple.   Skin:     General: Skin is warm.   Neurological:      Mental Status: She is alert and oriented to person, place, and time. Mental status is at baseline.   Psychiatric:         Mood and Affect: Mood normal.       Laboratory:  Recent Labs     01/14/23 1950   WBC 11.7*   RBC 2.90*   HEMOGLOBIN 8.9*   HEMATOCRIT 26.8*   MCV 92.4   MCH 30.7   MCHC 33.2*   RDW 49.5   PLATELETCT 433   MPV 10.6     Recent Labs     01/14/23 1950   SODIUM 131*    POTASSIUM 3.1*   CHLORIDE 97   CO2 23   GLUCOSE 109*   BUN 19   CREATININE 0.62   CALCIUM 8.4*     Recent Labs     01/14/23 1950   ALTSGPT 14   ASTSGOT 28   ALKPHOSPHAT 130*   TBILIRUBIN 0.9   LIPASE 18   GLUCOSE 109*     Recent Labs     01/14/23 2018   APTT 41.0*   INR 1.25*     Recent Labs     01/14/23 1950   NTPROBNP 498*         Recent Labs     01/14/23 1950   TROPONINT 7       Imaging:  DX-CHEST-PORTABLE (1 VIEW)   Final Result      Findings suspicious for multifocal pneumonitis.          X-Ray:  I have personally reviewed the images and compared with prior images.    Assessment/Plan:  Justification for Admission Status  I anticipate this patient will require at least two midnights for appropriate medical management, necessitating inpatient admission because possible PCP pneumonitis, HIV    Patient will need a  bed on EMERGENCY service .  The need is secondary to PCP/HIV treatment and further work-up.    * Pneumonitis- (present on admission)  Assessment & Plan  hx of HIV, noncompliant with treatment  Patient was hospitalized 11/25-11/26 and was treated for possible PCP pneumonia. CT scan showed bilateral opacities.  However, patient left AMA. CD4 count, HIV viral load, were ordered but not done.      Chest x-ray reviewed and consistent with multifocal pneumonitis.   At the ER, patient requires 2 L NC to keep SaO2 93%.    Started on Bactrim and steroids  Consult ID a.m.      HIV (human immunodeficiency virus infection) (HCC)- (present on admission)  Assessment & Plan  Noncompliant with treatment, [facility]  Ordered CD4 count, HIV viral load    Please consult ID a.m.    IV drug user  Assessment & Plan  History of IV heroin use  UDS    Normocytic anemia  Assessment & Plan  Likely due to HIV and malnutrition  Continue to monitor    Hyponatremia- (present on admission)  Assessment & Plan  Mild  Continue to monitor        VTE prophylaxis: enoxaparin ppx

## 2023-01-15 NOTE — ASSESSMENT & PLAN NOTE
Noncompliant with treatment, [facility]  Ordered CD4 count, HIV viral load    Discussed with Dr Seo he will see tomorrow.

## 2023-01-15 NOTE — PROGRESS NOTES
Hospital Medicine Daily Progress Note    Date of Service  1/15/2023    Chief Complaint  Karla Baker is a 62 y.o. female admitted 1/14/2023 with cough    Hospital Course  Karla Baker is a 62 y.o. female with hx of HIV (followed with [facility]), heroin use (subcu and iv), who presented 1/14/2023 with cough and shortness of breath, subjective fever and chills, body soreness.  Patient reported worsening productive cough and shortness of breath for 3 weeks.  Chest x-ray reviewed and consistent with multifocal pneumonitis.      Patient was hospitalized 11/25-11/26 and was treated for possible PCP pneumonia. CT scan showed bilateral opacities.  However, patient left AMA. CD4 count, HIV viral load, were ordered but not done.  Patient used to follow-up with [facility] for HIV treatment.  However, she ran out of the medication for a few months.  She is currently homeless.      At the ER, patient requires 2 L NC to keep SaO2 93%.   Sodium 133, potassium 3.1, proBNP 498, WBC 11.7, Hgb 8.9,     Interval Problem Update  1/15 in bed, weak, sob, cough, no abdominal pain nor chest pain, speak in short sentences, cachetic, discussed with Dr Seo ID, ddimer elevated getting stat cta chest, US lower ext, continue iv and po abx.      I have discussed this patient's plan of care and discharge plan at IDT rounds today with Case Management, Nursing, Nursing leadership, and other members of the IDT team.    Consultants/Specialty  infectious disease    Code Status  Full Code    Disposition  Patient is not medically cleared for discharge.   Anticipate discharge to to home with organized home healthcare and close outpatient follow-up.  I have placed the appropriate orders for post-discharge needs.    Review of Systems  Review of Systems   Constitutional:  Positive for malaise/fatigue and weight loss. Negative for chills and fever.   HENT:  Negative for congestion, nosebleeds and sinus pain.    Eyes:  Negative for blurred vision  and double vision.   Respiratory:  Positive for cough and shortness of breath. Negative for hemoptysis and wheezing.    Cardiovascular:  Negative for chest pain, palpitations, claudication, leg swelling and PND.   Gastrointestinal:  Negative for heartburn, nausea and vomiting.   Genitourinary:  Negative for hematuria and urgency.   Musculoskeletal:  Negative for back pain and myalgias.   Skin:  Negative for rash.   Neurological:  Positive for weakness. Negative for dizziness and headaches.   Endo/Heme/Allergies:  Does not bruise/bleed easily.   Psychiatric/Behavioral:  Negative for depression, substance abuse and suicidal ideas.       Physical Exam  Temp:  [37.4 °C (99.4 °F)] 37.4 °C (99.4 °F)  Pulse:  [] 88  Resp:  [11-23] 22  BP: ()/(53-66) 152/66  SpO2:  [92 %-100 %] 100 %    Physical Exam  Vitals and nursing note reviewed.   Constitutional:       General: She is in acute distress.      Appearance: She is ill-appearing and toxic-appearing.   HENT:      Head: Normocephalic.      Mouth/Throat:      Mouth: Mucous membranes are dry.      Pharynx: Oropharynx is clear. No oropharyngeal exudate or posterior oropharyngeal erythema.   Eyes:      General: No scleral icterus.        Right eye: No discharge.         Left eye: No discharge.      Conjunctiva/sclera: Conjunctivae normal.   Cardiovascular:      Rate and Rhythm: Normal rate and regular rhythm.      Pulses: Normal pulses.      Heart sounds: Normal heart sounds.   Pulmonary:      Effort: Pulmonary effort is normal.      Breath sounds: Rhonchi and rales present. No wheezing.   Abdominal:      General: Bowel sounds are normal. There is no distension.      Palpations: Abdomen is soft.      Tenderness: There is no abdominal tenderness. There is no guarding.   Musculoskeletal:         General: Normal range of motion.      Cervical back: Normal range of motion and neck supple. No rigidity or tenderness.      Right lower leg: No edema.      Left lower leg: No  edema.   Skin:     General: Skin is warm and dry.      Capillary Refill: Capillary refill takes less than 2 seconds.      Coloration: Skin is not jaundiced.   Neurological:      General: No focal deficit present.      Mental Status: She is oriented to person, place, and time.      Cranial Nerves: No cranial nerve deficit.      Motor: No weakness.   Psychiatric:         Mood and Affect: Mood normal.         Behavior: Behavior normal.       Fluids    Intake/Output Summary (Last 24 hours) at 1/15/2023 1416  Last data filed at 1/14/2023 2358  Gross per 24 hour   Intake 1500 ml   Output --   Net 1500 ml       Laboratory  Recent Labs     01/14/23  1950 01/15/23  0954   WBC 11.7* 11.9*   RBC 2.90* 3.43*   HEMOGLOBIN 8.9* 9.9*   HEMATOCRIT 26.8* 30.5*   MCV 92.4 88.9   MCH 30.7 28.9   MCHC 33.2* 32.5*   RDW 49.5 47.5   PLATELETCT 433 498*   MPV 10.6 9.7     Recent Labs     01/14/23  1950 01/15/23  0954   SODIUM 131* 136   POTASSIUM 3.1* 2.9*   CHLORIDE 97 100   CO2 23 23   GLUCOSE 109* 128*   BUN 19 13   CREATININE 0.62 0.64   CALCIUM 8.4* 8.5     Recent Labs     01/14/23 2018   APTT 41.0*   INR 1.25*               Imaging  DX-CHEST-PORTABLE (1 VIEW)   Final Result      Findings suspicious for multifocal pneumonitis.      CT-CTA CHEST PULMONARY ARTERY W/ RECONS    (Results Pending)   US-EXTREMITY VENOUS LOWER BILAT    (Results Pending)        Assessment/Plan  * Pneumonitis- (present on admission)  Assessment & Plan  hx of HIV, noncompliant with treatment  Patient was hospitalized 11/25-11/26 and was treated for possible PCP pneumonia. CT scan showed bilateral opacities.  However, patient left AMA. CD4 count, HIV viral load, were ordered but not done.      Chest x-ray reviewed and consistent with multifocal pneumonitis.   At the ER, patient requires 2 L NC to keep SaO2 93%.    Started on Bactrim and steroids  Discussed with ID Dr Spicer he agreed with bactrim and steroids, recommended to start ceftriaxone too.        COVID-19- (present on admission)  Assessment & Plan  covid 19 pneumonia  supportive treatment  steroids  o2 per protocol     ddimer elevated checking CTA chest  lower ext pain will get us lower ext.     IV drug user  Assessment & Plan  History of IV heroin use  UDS    Normocytic anemia  Assessment & Plan  Likely due to HIV and malnutrition  Continue to monitor    Hyponatremia- (present on admission)  Assessment & Plan  Mild  Continue to monitor      HIV (human immunodeficiency virus infection) (HCC)- (present on admission)  Assessment & Plan  Noncompliant with treatment, [facility]  Ordered CD4 count, HIV viral load    Discussed with Dr Seo he will see tomorrow.            VTE prophylaxis: enoxaparin ppx    I have performed a physical exam and reviewed and updated ROS and Plan today (1/15/2023). In review of yesterday's note (1/14/2023), there are no changes except as documented above.      My total time spent caring for the patient on the day of the encounter was 39 minutes.   This does not include time spent on separately billable procedures/tests.  Patient is has a high medical complexity, complex decision making and is at high risk for complication, morbidity, and mortality.

## 2023-01-15 NOTE — DISCHARGE SUMMARY
Discharge Summary AMA    CHIEF COMPLAINT ON ADMISSION  Chief Complaint   Patient presents with    Cough     Productive cough, generalized weakness and malaise for 2 weeks, hx of untreated HIV and drug use. Oriented x 4.        Reason for Admission  ems     Admission Date  1/14/2023    CODE STATUS  Full Code    HPI & HOSPITAL COURSE    Please see original H&P for specific information.   Karla Baker is a 62 y.o. female with hx of HIV (followed with [facility]), heroin use (subcu and iv), who presented 1/14/2023 with cough and shortness of breath, subjective fever and chills, body soreness.  Patient reported worsening productive cough and shortness of breath for 3 weeks.  Chest x-ray reviewed and consistent with multifocal pneumonitis.      Patient was hospitalized 11/25-11/26 and was treated for possible PCP pneumonia. CT scan showed bilateral opacities.  However, patient left AMA. CD4 count, HIV viral load, were ordered but not done.  Patient used to follow-up with [facility] for HIV treatment.  However, she ran out of the medication for a few months.  She is currently homeless.      At the ER, patient requires 2 L NC to keep SaO2 93%.   Sodium 133, potassium 3.1, proBNP 498, WBC 11.7, Hgb 8.9,     Patient have decided to leave AGAINST MEDICAL ADVICE, patient positive for COVID-19 with elevated D-dimer patient also concerning for possible PCP pneumonia, history of HIV not taking her medication for long time, infectious disease Dr. Seo was consulted, unfortunately patient has decided to leave AGAINST MEDICAL ADVICE, please refer to [facility] progress note for more specific information.    Discussed with nurse staff.          Discharge Date  1/15/2023    FOLLOW UP ITEMS POST DISCHARGE  Needs to follow-up with primary care physician and infectious disease as soon as possible, patient was advised to come back to the emergency room as soon as possible.    DISCHARGE DIAGNOSES  Principal Problem:    Pneumonitis POA:  Yes  Active Problems:    HIV (human immunodeficiency virus infection) (HCC) POA: Yes    Hyponatremia POA: Yes    Normocytic anemia POA: Unknown    IV drug user POA: Unknown    COVID-19 POA: Yes  Resolved Problems:    * No resolved hospital problems. *      FOLLOW UP  No future appointments.  No follow-up provider specified.    MEDICATIONS ON DISCHARGE     Medication List      You have not been prescribed any medications.         Allergies  No Known Allergies    DIET  Orders Placed This Encounter   Procedures    Diet Order Diet: Regular     Standing Status:   Standing     Number of Occurrences:   1     Order Specific Question:   Diet:     Answer:   Regular [1]       ACTIVITY  AMA    CONSULTATIONS  ID    PROCEDURES  None    LABORATORY  Lab Results   Component Value Date    SODIUM 136 01/15/2023    POTASSIUM 2.9 (L) 01/15/2023    CHLORIDE 100 01/15/2023    CO2 23 01/15/2023    GLUCOSE 128 (H) 01/15/2023    BUN 13 01/15/2023    CREATININE 0.64 01/15/2023        Lab Results   Component Value Date    WBC 11.9 (H) 01/15/2023    HEMOGLOBIN 9.9 (L) 01/15/2023    HEMATOCRIT 30.5 (L) 01/15/2023    PLATELETCT 498 (H) 01/15/2023        Total time of the discharge process exceeds 30 minutes.

## 2023-01-15 NOTE — ED NOTES
Report received from [facility] RN Ramya.    Pt able to ambulate to the restroom.      RA saturation 89%, pt now 95% on 2L NC.     Plan of care discussed questions asked and answered.

## 2023-01-15 NOTE — ED NOTES
Requested urine specimen, pt states she doesn't have to urinate, very sleepy, very little interaction.

## 2023-01-15 NOTE — ED NOTES
PT accidentally pulled out IV, unable to obtain any further IV access or AM labs. Will call phlebotomy to draw morning labs. Will make attending aware of no IV access.

## 2023-01-15 NOTE — ED NOTES
Pt still requesting AMA after long conversation w/ provider.  Pt aox4 and verbalized understanding and risk of leaving AMA when requiring supplemental o2.  PIV removed and intact.  Ambulated out w/ steady gait

## 2023-01-15 NOTE — ED NOTES
Pt found by security kana beckford in peds. Pt escorted back to rm and placed on monitor. Educated on importance of staying in rm and calling for assistance w/ call light.

## 2023-01-15 NOTE — ED NOTES
Pt removed Nasal Canula and monitor sticker. Discussed importance of leaving monitor and O2 in place.

## 2023-01-15 NOTE — ED NOTES
Found pt w/ clothes on exiting rm requesting to leave AMA.  Educated on importance of staying but still requesting AMA.  Provider Virginia Bowers notified and to see pt

## 2023-01-15 NOTE — ASSESSMENT & PLAN NOTE
hx of HIV, noncompliant with treatment  Patient was hospitalized 11/25-11/26 and was treated for possible PCP pneumonia. CT scan showed bilateral opacities.  However, patient left AMA. CD4 count, HIV viral load, were ordered but not done.      Chest x-ray reviewed and consistent with multifocal pneumonitis.   At the ER, patient requires 2 L NC to keep SaO2 93%.    Started on Bactrim and steroids  Discussed with ID Dr Spicer he agreed with bactrim and steroids, recommended to start ceftriaxone too.

## 2023-01-15 NOTE — ED NOTES
Med rec completed per patient at bedside.  Allergies reviewed with patient. NKDA.  No preferred pharmacy per patient.     Patient denies using any prescription medications at home at this time.  No vitamins or supplements.  No recent over-the-counter medications.  No outpatient antibiotics within the last 30 days.

## 2023-01-16 ENCOUNTER — APPOINTMENT (OUTPATIENT)
Dept: RADIOLOGY | Facility: MEDICAL CENTER | Age: 63
DRG: 974 | End: 2023-01-16
Attending: INTERNAL MEDICINE
Payer: COMMERCIAL

## 2023-01-16 PROBLEM — J18.9 COMMUNITY ACQUIRED PNEUMONIA: Status: ACTIVE | Noted: 2023-01-14

## 2023-01-16 PROBLEM — B37.81 CANDIDA ESOPHAGITIS (HCC): Status: ACTIVE | Noted: 2023-01-16

## 2023-01-16 LAB
ALBUMIN SERPL BCP-MCNC: 2.4 G/DL (ref 3.2–4.9)
ALBUMIN/GLOB SERPL: 0.4 G/DL
ALP SERPL-CCNC: 105 U/L (ref 30–99)
ALT SERPL-CCNC: 13 U/L (ref 2–50)
ANION GAP SERPL CALC-SCNC: 12 MMOL/L (ref 7–16)
AST SERPL-CCNC: 21 U/L (ref 12–45)
BASOPHILS # BLD AUTO: 0.2 % (ref 0–1.8)
BASOPHILS # BLD: 0.01 K/UL (ref 0–0.12)
BILIRUB SERPL-MCNC: 0.2 MG/DL (ref 0.1–1.5)
BUN SERPL-MCNC: 12 MG/DL (ref 8–22)
CALCIUM ALBUM COR SERPL-MCNC: 9.3 MG/DL (ref 8.5–10.5)
CALCIUM SERPL-MCNC: 8 MG/DL (ref 8.5–10.5)
CHLORIDE SERPL-SCNC: 103 MMOL/L (ref 96–112)
CO2 SERPL-SCNC: 21 MMOL/L (ref 20–33)
CREAT SERPL-MCNC: 0.57 MG/DL (ref 0.5–1.4)
EOSINOPHIL # BLD AUTO: 0 K/UL (ref 0–0.51)
EOSINOPHIL NFR BLD: 0 % (ref 0–6.9)
ERYTHROCYTE [DISTWIDTH] IN BLOOD BY AUTOMATED COUNT: 48.6 FL (ref 35.9–50)
GFR SERPLBLD CREATININE-BSD FMLA CKD-EPI: 102 ML/MIN/1.73 M 2
GLOBULIN SER CALC-MCNC: 5.4 G/DL (ref 1.9–3.5)
GLUCOSE SERPL-MCNC: 151 MG/DL (ref 65–99)
HCT VFR BLD AUTO: 29.8 % (ref 37–47)
HGB BLD-MCNC: 9.5 G/DL (ref 12–16)
IMM GRANULOCYTES # BLD AUTO: 0.05 K/UL (ref 0–0.11)
IMM GRANULOCYTES NFR BLD AUTO: 0.8 % (ref 0–0.9)
LYMPHOCYTES # BLD AUTO: 0.57 K/UL (ref 1–4.8)
LYMPHOCYTES NFR BLD: 8.8 % (ref 22–41)
MCH RBC QN AUTO: 28.9 PG (ref 27–33)
MCHC RBC AUTO-ENTMCNC: 31.9 G/DL (ref 33.6–35)
MCV RBC AUTO: 90.6 FL (ref 81.4–97.8)
MONOCYTES # BLD AUTO: 0.09 K/UL (ref 0–0.85)
MONOCYTES NFR BLD AUTO: 1.4 % (ref 0–13.4)
NEUTROPHILS # BLD AUTO: 5.73 K/UL (ref 2–7.15)
NEUTROPHILS NFR BLD: 88.8 % (ref 44–72)
NRBC # BLD AUTO: 0 K/UL
NRBC BLD-RTO: 0 /100 WBC
PLATELET # BLD AUTO: 449 K/UL (ref 164–446)
PMV BLD AUTO: 10.2 FL (ref 9–12.9)
POTASSIUM SERPL-SCNC: 3.3 MMOL/L (ref 3.6–5.5)
PROT SERPL-MCNC: 7.8 G/DL (ref 6–8.2)
RBC # BLD AUTO: 3.29 M/UL (ref 4.2–5.4)
SODIUM SERPL-SCNC: 136 MMOL/L (ref 135–145)
WBC # BLD AUTO: 6.5 K/UL (ref 4.8–10.8)

## 2023-01-16 PROCEDURE — 99233 SBSQ HOSP IP/OBS HIGH 50: CPT | Performed by: INTERNAL MEDICINE

## 2023-01-16 PROCEDURE — A9270 NON-COVERED ITEM OR SERVICE: HCPCS | Performed by: INTERNAL MEDICINE

## 2023-01-16 PROCEDURE — 700102 HCHG RX REV CODE 250 W/ 637 OVERRIDE(OP): Performed by: INTERNAL MEDICINE

## 2023-01-16 PROCEDURE — 770020 HCHG ROOM/CARE - TELE (206)

## 2023-01-16 PROCEDURE — 700111 HCHG RX REV CODE 636 W/ 250 OVERRIDE (IP): Performed by: INTERNAL MEDICINE

## 2023-01-16 PROCEDURE — 85025 COMPLETE CBC W/AUTO DIFF WBC: CPT

## 2023-01-16 PROCEDURE — 71275 CT ANGIOGRAPHY CHEST: CPT

## 2023-01-16 PROCEDURE — 80053 COMPREHEN METABOLIC PANEL: CPT

## 2023-01-16 PROCEDURE — 99222 1ST HOSP IP/OBS MODERATE 55: CPT | Performed by: INTERNAL MEDICINE

## 2023-01-16 PROCEDURE — 700117 HCHG RX CONTRAST REV CODE 255: Performed by: INTERNAL MEDICINE

## 2023-01-16 RX ORDER — LORAZEPAM 1 MG/1
1 TABLET ORAL EVERY 4 HOURS PRN
Status: DISCONTINUED | OUTPATIENT
Start: 2023-01-16 | End: 2023-01-17 | Stop reason: HOSPADM

## 2023-01-16 RX ORDER — METHADONE HYDROCHLORIDE 10 MG/1
5 TABLET ORAL DAILY
Status: DISCONTINUED | OUTPATIENT
Start: 2023-01-16 | End: 2023-01-17 | Stop reason: HOSPADM

## 2023-01-16 RX ORDER — FLUCONAZOLE 100 MG/1
200 TABLET ORAL DAILY
Status: DISCONTINUED | OUTPATIENT
Start: 2023-01-17 | End: 2023-01-17 | Stop reason: HOSPADM

## 2023-01-16 RX ORDER — FLUCONAZOLE 2 MG/ML
400 INJECTION, SOLUTION INTRAVENOUS ONCE
Status: COMPLETED | OUTPATIENT
Start: 2023-01-16 | End: 2023-01-16

## 2023-01-16 RX ORDER — AMOXICILLIN AND CLAVULANATE POTASSIUM 500; 125 MG/1; MG/1
1 TABLET, FILM COATED ORAL EVERY 8 HOURS
Status: DISCONTINUED | OUTPATIENT
Start: 2023-01-17 | End: 2023-01-17 | Stop reason: HOSPADM

## 2023-01-16 RX ORDER — SULFAMETHOXAZOLE AND TRIMETHOPRIM 800; 160 MG/1; MG/1
1 TABLET ORAL DAILY
Status: DISCONTINUED | OUTPATIENT
Start: 2023-01-17 | End: 2023-01-17 | Stop reason: HOSPADM

## 2023-01-16 RX ADMIN — FLUCONAZOLE 400 MG: 2 INJECTION, SOLUTION INTRAVENOUS at 15:14

## 2023-01-16 RX ADMIN — SENNOSIDES AND DOCUSATE SODIUM 2 TABLET: 50; 8.6 TABLET ORAL at 17:18

## 2023-01-16 RX ADMIN — ENOXAPARIN SODIUM 40 MG: 40 INJECTION SUBCUTANEOUS at 17:18

## 2023-01-16 RX ADMIN — SULFAMETHOXAZOLE AND TRIMETHOPRIM 2 TABLET: 800; 160 TABLET ORAL at 05:18

## 2023-01-16 RX ADMIN — LORAZEPAM 1 MG: 1 TABLET ORAL at 15:56

## 2023-01-16 RX ADMIN — OXYCODONE HYDROCHLORIDE 5 MG: 5 TABLET ORAL at 05:17

## 2023-01-16 RX ADMIN — PREDNISONE 40 MG: 20 TABLET ORAL at 05:18

## 2023-01-16 RX ADMIN — POTASSIUM CHLORIDE 40 MEQ: 20 TABLET, EXTENDED RELEASE ORAL at 17:18

## 2023-01-16 RX ADMIN — METHADONE HYDROCHLORIDE 5 MG: 10 TABLET ORAL at 09:46

## 2023-01-16 RX ADMIN — CEFTRIAXONE SODIUM 1000 MG: 10 INJECTION, POWDER, FOR SOLUTION INTRAVENOUS at 05:18

## 2023-01-16 RX ADMIN — POTASSIUM CHLORIDE 40 MEQ: 20 TABLET, EXTENDED RELEASE ORAL at 05:18

## 2023-01-16 RX ADMIN — LORAZEPAM 1 MG: 1 TABLET ORAL at 09:46

## 2023-01-16 RX ADMIN — IOHEXOL 65 ML: 350 INJECTION, SOLUTION INTRAVENOUS at 03:03

## 2023-01-16 ASSESSMENT — COGNITIVE AND FUNCTIONAL STATUS - GENERAL
SUGGESTED CMS G CODE MODIFIER MOBILITY: CJ
MOBILITY SCORE: 22
CLIMB 3 TO 5 STEPS WITH RAILING: A LITTLE
DAILY ACTIVITIY SCORE: 24
SUGGESTED CMS G CODE MODIFIER DAILY ACTIVITY: CH
WALKING IN HOSPITAL ROOM: A LITTLE

## 2023-01-16 ASSESSMENT — ENCOUNTER SYMPTOMS
NAUSEA: 0
NERVOUS/ANXIOUS: 1
HEADACHES: 0
MYALGIAS: 1
DIARRHEA: 0
ABDOMINAL PAIN: 0
SHORTNESS OF BREATH: 1
WEIGHT LOSS: 1
COUGH: 1

## 2023-01-16 ASSESSMENT — PAIN DESCRIPTION - PAIN TYPE
TYPE: ACUTE PAIN
TYPE: ACUTE PAIN

## 2023-01-16 ASSESSMENT — LIFESTYLE VARIABLES: SUBSTANCE_ABUSE: 1

## 2023-01-16 ASSESSMENT — FIBROSIS 4 INDEX: FIB4 SCORE: 0.82

## 2023-01-16 NOTE — ED NOTES
Med rec completed per patient  Allergies reviewed  No PO Antibiotics in the last 30 days     Patient denies taking any medications, RX or OTC

## 2023-01-16 NOTE — PROGRESS NOTES
"Assumed care of pt. Bedside report received from Mireille LORENZANA. Pt was updated on plan of care. Call light, phone and personal belongings in reach. Bed alarm on and working properly, bed in lowest position, and locked.     Pt complaining of with drawling from heroin, pt is anxious in bed states \" she has not slept in three days\" pt keeps moving legs, restless in bed. Dr. Bee notified. No new orders at this time.   "

## 2023-01-16 NOTE — CARE PLAN
The patient is Stable - Low risk of patient condition declining or worsening    Shift Goals  Clinical Goals: pain control, safety  Patient Goals: pain control sleep    Progress made toward(s) clinical / shift goals:    Problem: Knowledge Deficit - Standard  Goal: Patient and family/care givers will demonstrate understanding of plan of care, disease process/condition, diagnostic tests and medications  Outcome: Progressing     Problem: Pain - Standard  Goal: Alleviation of pain or a reduction in pain to the patient’s comfort goal  Outcome: Progressing       Patient is not progressing towards the following goals:

## 2023-01-16 NOTE — PROGRESS NOTES
4 Eyes Skin Assessment Completed by SALOMÓN Kendrick and SALOMÓN Kuo.    Head WDL  Ears WDL  Nose WDL  Mouth WDL  Neck WDL  Breast/Chest WDL  Shoulder Blades WDL  Spine WDL  (R) Arm/Elbow/Hand WDL  (L) Arm/Elbow/Hand WDL  Abdomen Scar and Scab, lesions  Groin WDL  Scrotum/Coccyx/Buttocks WDL  (R) Leg WDL  (L) Leg WDL, scar  (R) Heel/Foot/Toe WDL  (L) Heel/Foot/Toe WDL          Devices In Places Tele Box, Blood Pressure Cuff, and Pulse Ox      Interventions In Place Waffle Overlay, Pillows, and Pressure Redistribution Mattress    Possible Skin Injury No    Pictures Uploaded Into Epic Yes  Wound Consult Placed N/A  RN Wound Prevention Protocol Ordered No

## 2023-01-16 NOTE — PROGRESS NOTES
Patient arrived to tele 8. Patient is A & O x 4, room air, SR on the monitor.  Complaining of pain 10/10, aching in bilateral lower legs.Enhanced droplet precautions in place. Fall precautions in place. Call light in reach.

## 2023-01-16 NOTE — DISCHARGE PLANNING
Case Management Discharge Planning    Admission Date: 1/15/2023  GMLOS: 5.2  ALOS: 1    6-Clicks ADL Score: 24  6-Clicks Mobility Score: 22    hx of HIV (followed with [facility]), heroin use (subcu and iv), who presented 1/14/2023 with cough and shortness of breath, subjective fever and chills. Patient left AMA in 1/14 /23 . Patient lives with    Anticipated Discharge Dispo:  Home with  .    DME Needed: No    Action(s) Taken: Updated Provider/Nurse on Discharge Plan    Escalations Completed: Provider    Medically Clear: No    Next Steps: Plan to follow up medically team regarding HIV treatment .     Barriers to Discharge: Medical clearance    Is the patient up for discharge tomorrow: No    Care Transition Team Assessment    Information Source  Orientation Level: Oriented to place, Oriented to situation, Oriented to person, Oriented to time         Elopement Risk  Legal Hold: No  Ambulatory or Self Mobile in Wheelchair: Yes  Disoriented: No  Psychiatric Symptoms: None  History of Wandering: No  Elopement this Admit: No  Vocalizing Wanting to Leave: No  Displays Behaviors, Body Language Wanting to Leave: No-Not at Risk for Elopement  Elopement Risk: Not at Risk for Elopement    Interdisciplinary Discharge Planning  Lives with - Patient's Self Care Capacity: Alone and Able to Care For Self  Patient or legal guardian wants to designate a caregiver: No  Support Systems: Children  Housing / Facility: [facility]  Durable Medical Equipment: Not Applicable    Domestic Abuse  Have you ever been the victim of abuse or violence?: No  Physical Abuse or Sexual Abuse: No  Verbal Abuse or Emotional Abuse: No  Possible Abuse/Neglect Reported to:: Not Applicable

## 2023-01-16 NOTE — CARE PLAN
The patient is Stable - Low risk of patient condition declining or worsening    Shift Goals  Clinical Goals: monitor withdrawl, and US of extremities  Patient Goals: sleep    Progress made toward(s) clinical / shift goals:    Problem: Pain - Standard  Goal: Alleviation of pain or a reduction in pain to the patient’s comfort goal  Outcome: Progressing     Problem: Depression  Goal: Patient and family/caregiver will verbalize accurate information about at least two of the possible causes of depression, three-four of the signs and symptoms of depression  Outcome: Progressing       Patient is not progressing towards the following goals:

## 2023-01-16 NOTE — ED PROVIDER NOTES
"ED Provider Note    CHIEF COMPLAINT  Chief Complaint   Patient presents with    Shortness of Breath       HPI/ROS    Karla Baker is a 62 y.o. female who presents to the emergency department complaining of worsening shortness of breath and requesting to be readmitted.  The patient just left the hospital earlier today AGAINST MEDICAL ADVICE after being diagnosed with COVID and multifocal pneumonia.  The patient now states that it was a bad decision to leave the hospital and she would like to be readmitted.  She says that she has been coughing and short of breath and generally feeling very poorly.  Despite feeling poorly she continues to use methamphetamine and narcotics stating her last use was a couple of days ago.  She does not recognize any exacerbating relieving factors or precipitating events.  The patient confirms that she was diagnosed with HIV about 6 years ago and states that she has not taken medications for her HIV for \"a long time\".  History of present illness and review of systems are limited by the fact that the patient is an extremely poor historian.    Review of systems: No hemoptysis no black or bloody stool or emesis    Chart review: I reviewed the notes from earlier today and the patient did leave AGAINST MEDICAL ADVICE and there was some concern that she may have COVID but also PCP pneumonia given her HIV status and lack of treatment.  I reviewed the most recent labs from 9:45 AM this morning a CBC showed a white blood cell count of 11.9 hemoglobin is adequate at 9.9 basic metabolic panel showed a low potassium of 2.9 procalcitonin was elevated at 1.14 LDH was within the normal range at 187 urine toxicology screen positive for amphetamines and opiates D-dimer was 3.62.  Urinalysis showed many epithelial cells with nitrite and leukocyte Estrace negative and COVID testing was positive.  A chest x-ray from yesterday at 9:01 PM showed multifocal pneumonia.    PAST MEDICAL HISTORY   HIV, not " taking medications at this time    SURGICAL HISTORY  patient denies any surgical history    FAMILY HISTORY  No family history on file.    SOCIAL HISTORY  Social History     Tobacco Use    Smoking status: Every Day     Types: Cigarettes    Smokeless tobacco: Never   Vaping Use    Vaping Use: Never used   Substance and Sexual Activity    Alcohol use: Not Currently    Drug use: Yes     Comment: heroin    Sexual activity: Not on file       CURRENT MEDICATIONS  Home Medications       Reviewed by Grace Santana (Pharmacy Tech) on 01/15/23 at 2034  Med List Status: Complete     Medication Last Dose Status        Patient Patrice Taking any Medications                           ALLERGIES  No Known Allergies    PHYSICAL EXAM  VITAL SIGNS: BP (!) 140/71   Pulse 86   Temp 36.7 °C (98.1 °F) (Temporal)   Resp (!) 28   Wt 40.7 kg (89 lb 11.6 oz)   SpO2 97%   BMI 16.41 kg/m²    Constitutional: Awake verbal extremely poor historian who is disheveled and appears ill  HENT: Mucous membranes are dry  Eyes: No erythema discharge or jaundice  Neck: No meningeal findings  Cardiovascular: Regular tachycardia  Respiratory: Distant bilaterally and she has a coarse cough  Abdomen: Very thin soft no peritoneal findings  Skin: Warm and dry  Musculoskeletal: Leg edema, no acute bony deformity  Neurologic: Awake verbal moving all extremities  Psychiatric:.  The patient has an abnormal presentation and she barely answers any questions she keeps her head covered with the covers and she is not particularly cooperative in providing historical information at this time    DIAGNOSTIC STUDIES / PROCEDURES  EKG  I have independently interpreted this EKG  A twelve-lead EKG shows sinus rhythm 82 bpm no pathologic ST elevation or depression GA interval 122 ms QTc interval 493 ms    LABS  CBC shows white blood cell count of 9.4 hemoglobin remains low at 9.7 potassium remains low at 2.8    RADIOLOGY  DX-CHEST-PORTABLE (1 VIEW)   Final Result          1.  Pulmonary edema and/or infiltrates are identified, which are somewhat decreased since the prior exam.   2.  Atherosclerosis      CT-CTA CHEST PULMONARY ARTERY W/ RECONS    (Results Pending)   US-EXTREMITY VENOUS LOWER BILAT    (Results Pending)         COURSE & MEDICAL DECISION MAKING  In the emergency department an IV was established the patient was placed on the cardiac monitor I reviewed her case with the ER pharmacist and the patient was given intravenous antibiotics for concern regarding bacterial pneumonia or possibly PCP.  The patient was given intravenous Decadron.  The patient now is requesting rehospitalization so I reviewed the case with the hospitalist and the patient is readmitted to the hospitalist service and they have written orders to resume their evaluation and she will be getting a CT angiogram of the chest and ultrasound of the legs and the hospitalist has written orders for supplemental potassium.    FINAL DIAGNOSIS  1. COVID-19    2. Multifocal pneumonia    3. HIV infection, unspecified symptom status (HCC)    4. Amphetamine abuse (HCC)    5. Opiate abuse, continuous (HCC)    6. Noncompliance           Electronically signed by: Michael Otero M.D., 1/15/2023 10:34 PM

## 2023-01-16 NOTE — ASSESSMENT & PLAN NOTE
hx of HIV, noncompliant with treatment  CD4 is likely very low  Dr. Seo consulted plan for augmentin for CAP and bactrim for PCP ppx

## 2023-01-16 NOTE — ASSESSMENT & PLAN NOTE
Heroin use  UDS positive for amphetamine and opiate  She is trying to be more consistent with methadone clinic

## 2023-01-16 NOTE — H&P
Hospital Medicine History & Physical Note    Date of Service  1/15/2023    Primary Care Physician  Stoney Linder D.O.    Consultants  None       Code Status  Full Code    Chief Complaint  Chief Complaint   Patient presents with    Shortness of Breath       History of Presenting Illness  Karla Baker is a 62 y.o. female who presented 1/15/2023 with SOB .  is a 62 y.o. female with hx of HIV (followed with [facility]), heroin use (subcu and iv), who presented 1/14/2023 with cough and shortness of breath, subjective fever and chills, body soreness.  Patient reported worsening productive cough and shortness of breath for 3 weeks.  Chest x-ray reviewed and consistent with multifocal pneumonitis.    she was here and left AMA early today but now comes back feeling worse and agreeing to stay this time  Started on steroids and also on bactrim as patient has HIV but not talking medication d-dimer also elevated so will check a CTA chest   Patient admitted for further treatment and work up     I discussed the plan of care with patient and ERP .    Review of Systems  Review of Systems   Constitutional:  Positive for malaise/fatigue and weight loss. Negative for chills and fever.   HENT:  Negative for congestion, nosebleeds and sinus pain.    Eyes:  Negative for blurred vision and double vision.   Respiratory:  Positive for cough and shortness of breath. Negative for hemoptysis and wheezing.    Cardiovascular:  Negative for chest pain and palpitations.   Gastrointestinal:  Negative for heartburn, nausea and vomiting.   Genitourinary:  Negative for hematuria and urgency.   Musculoskeletal:  Negative for back pain and myalgias.   Skin:  Negative for rash.   Neurological:  Positive for weakness. Negative for dizziness and headaches.   Endo/Heme/Allergies:  Does not bruise/bleed easily.   Psychiatric/Behavioral:  Negative for depression, substance abuse and suicidal ideas.    All other systems reviewed and are  negative.    Past Medical History   has no past medical history on file.    Surgical History   has no past surgical history on file.     Family History  family history is not on file.   Family history reviewed with patient. There is no family history that is pertinent to the chief complaint.     Social History   reports that she has been smoking cigarettes. She has never used smokeless tobacco. She reports that she does not currently use alcohol. She reports current drug use.    Allergies  No Known Allergies    Medications  None       Physical Exam  Temp:  [36.6 °C (97.8 °F)] 36.6 °C (97.8 °F)  Pulse:  [] 120  Resp:  [11-23] 20  BP: ()/(54-69) 118/69  SpO2:  [92 %-100 %] 92 %  Blood Pressure: 118/69   Temperature: 36.6 °C (97.8 °F)   Pulse: (!) 120   Respiration: 20   Pulse Oximetry: 92 %       Physical Exam  Vitals and nursing note reviewed.   Constitutional:       General: She is in acute distress.      Appearance: She is ill-appearing and toxic-appearing.   HENT:      Head: Normocephalic.      Mouth/Throat:      Mouth: Mucous membranes are dry.      Pharynx: Oropharynx is clear. No oropharyngeal exudate or posterior oropharyngeal erythema.   Eyes:      General: No scleral icterus.        Right eye: No discharge.         Left eye: No discharge.      Conjunctiva/sclera: Conjunctivae normal.   Cardiovascular:      Rate and Rhythm: Normal rate and regular rhythm.      Pulses: Normal pulses.      Heart sounds: Normal heart sounds.   Pulmonary:      Effort: Pulmonary effort is normal.      Breath sounds: Rhonchi and rales present. No wheezing.   Abdominal:      General: There is no distension.      Tenderness: There is no abdominal tenderness. There is no guarding.   Musculoskeletal:         General: Normal range of motion.      Cervical back: Normal range of motion and neck supple. No rigidity or tenderness.      Right lower leg: No edema.      Left lower leg: No edema.   Skin:     General: Skin is warm  and dry.      Capillary Refill: Capillary refill takes less than 2 seconds.      Coloration: Skin is not jaundiced.   Neurological:      General: No focal deficit present.      Mental Status: She is oriented to person, place, and time.      Cranial Nerves: No cranial nerve deficit.      Motor: No weakness.   Psychiatric:         Mood and Affect: Mood normal.         Behavior: Behavior normal.       Laboratory:  Recent Labs     01/14/23  1950 01/15/23  0954   WBC 11.7* 11.9*   RBC 2.90* 3.43*   HEMOGLOBIN 8.9* 9.9*   HEMATOCRIT 26.8* 30.5*   MCV 92.4 88.9   MCH 30.7 28.9   MCHC 33.2* 32.5*   RDW 49.5 47.5   PLATELETCT 433 498*   MPV 10.6 9.7     Recent Labs     01/14/23  1950 01/15/23  0954   SODIUM 131* 136   POTASSIUM 3.1* 2.9*   CHLORIDE 97 100   CO2 23 23   GLUCOSE 109* 128*   BUN 19 13   CREATININE 0.62 0.64   CALCIUM 8.4* 8.5     Recent Labs     01/14/23  1950 01/15/23  0954   ALTSGPT 14 13   ASTSGOT 28 22   ALKPHOSPHAT 130* 118*   TBILIRUBIN 0.9 0.6   LIPASE 18  --    GLUCOSE 109* 128*     Recent Labs     01/14/23 2018   APTT 41.0*   INR 1.25*     Recent Labs     01/14/23 1950   NTPROBNP 498*         Recent Labs     01/14/23 1950   TROPONINT 7       Imaging:  DX-CHEST-PORTABLE (1 VIEW)   Final Result         1.  Pulmonary edema and/or infiltrates are identified, which are somewhat decreased since the prior exam.   2.  Atherosclerosis      CT-CTA CHEST PULMONARY ARTERY W/ RECONS    (Results Pending)   US-EXTREMITY VENOUS LOWER BILAT    (Results Pending)       CXR: 1.  Pulmonary edema and/or infiltrates are identified, which are somewhat decreased since the prior exam.  2.  Atherosclerosis    Assessment/Plan:  Justification for Admission Status  I anticipate this patient will require at least two midnights for appropriate medical management, necessitating inpatient admission because covid pneumonia    Patient will need a Telemetry bed on MEDICAL service .  The need is secondary to Hypokalemia .    * Pneumonia  due to COVID-19 virus- (present on admission)  Assessment & Plan  Start on dexamethasone  Also start on abx since her procal is elevated  patient also HIV patient but not taking medication so starting bactrim for now for possible PCP        COVID-19- (present on admission)  Assessment & Plan  covid 19 pneumonia  supportive treatment  steroids  o2 per protocol      ddimer elevated checking CTA chest  lower ext pain will get us lower ext.        Normocytic anemia- (present on admission)  Assessment & Plan  Likely due to HIV and malnutrition  Continue to monitor    Pneumonitis- (present on admission)  Assessment & Plan  hx of HIV, noncompliant with treatment  Patient was hospitalized 11/25-11/26 and was treated for possible PCP pneumonia. CT scan showed bilateral opacities.  However, patient left AMA. CD4 count, HIV viral load, were ordered but not done.       Chest x-ray reviewed and consistent with multifocal pneumonitis.   Started bactrimn   Please discuss case with Dr. Seo from ID  in AM     Polysubstance abuse (HCC)- (present on admission)  Assessment & Plan  patient was in the hospital and left AMA early to come back now  Will check a urine drug screen     Hypokalemia- (present on admission)  Assessment & Plan  Potassium was 2.9 early this AM when she left AMA  Will start on repletion monitor on tele   recheck potassium     HIV (human immunodeficiency virus infection) (HCC)- (present on admission)  Assessment & Plan  Not following up and not taking medication \please dicuss case with Dr. Seo in AM regarding her HIV treatment         VTE prophylaxis: SCDs/TEDs and enoxaparin ppx

## 2023-01-16 NOTE — ED TRIAGE NOTES
Chief Complaint   Patient presents with    Shortness of Breath     Pt bib ems c/o sob, pt was admitted and left AMA. +covid with elevated ddimer pending ct scan. History of HIV but not compliant with her medications.

## 2023-01-16 NOTE — PROGRESS NOTES
Hospital Medicine Daily Progress Note    Date of Service  1/16/2023    Chief Complaint  Karla Baker is a 62 y.o. female admitted 1/15/2023 with SOB    Hospital Course  61 yo woman with HIV noncompliant on medications, heroin use who was hospitalized 11/25 - 11/26 for PNA and had left AMA. She presented to the ED 1/15 with SOB and cough. She was positive for COVID but left AMA. She  returned to the ED few hours later because of ongoing SOB. UDS was positive for amphetamine and opiates. CTA chest was negative for PE, scattered bilateral infiltrates.     Interval Problem Update  Hypok, replete  CD4 pending  On room air at rest  She feels weak, SOB, coughing. Her legs are aching.  She moved from [place] and because of transportation issues, off HAART for 3-4 months. Has been on methadone, but when she can't make the clinic she does relapse to using heroin  Discussed with Dr. Seo, treat for CAP and candida esophagitis, bactrim for PCP ppx and ok to discharge tomorrow with follow up at [facility] [place]. Will need 7 days of fluconazole and  4 weeks of daily bactrim ds.    I have discussed this patient's plan of care and discharge plan at IDT rounds today with Case Management, Nursing, Nursing leadership, and other members of the IDT team.    Consultants/Specialty  infectious disease    Code Status  Full Code    Disposition  Patient is not medically cleared for discharge.   Anticipate discharge to to home with close outpatient follow-up.  I have placed the appropriate orders for post-discharge needs.    Review of Systems  Review of Systems   Constitutional:  Positive for malaise/fatigue and weight loss.   Respiratory:  Positive for cough and shortness of breath.    Cardiovascular:  Positive for chest pain.   Gastrointestinal:  Negative for abdominal pain, diarrhea and nausea.   Genitourinary:  Negative for dysuria.   Musculoskeletal:  Positive for myalgias.   Neurological:  Negative for headaches.    Psychiatric/Behavioral:  Positive for substance abuse. The patient is nervous/anxious.       Physical Exam  Temp:  [36.4 °C (97.5 °F)-36.7 °C (98.1 °F)] 36.7 °C (98 °F)  Pulse:  [] 80  Resp:  [16-34] 16  BP: (108-142)/(60-81) 120/61  SpO2:  [92 %-97 %] 95 %    Physical Exam  Vitals and nursing note reviewed.   Constitutional:       Appearance: She is ill-appearing. She is not toxic-appearing.      Comments: frail   HENT:      Head: Normocephalic.      Mouth/Throat:      Mouth: Mucous membranes are dry.   Eyes:      General:         Right eye: No discharge.         Left eye: No discharge.   Cardiovascular:      Rate and Rhythm: Normal rate and regular rhythm.   Pulmonary:      Effort: Respiratory distress present.      Breath sounds: Rhonchi and rales present. No wheezing.   Abdominal:      Palpations: Abdomen is soft.      Tenderness: There is no abdominal tenderness. There is no guarding or rebound.      Comments: Abdomen with diffuse scarring from heroin use, no erythema or purulence seen from the wounds   Musculoskeletal:         General: No swelling.      Cervical back: Neck supple.      Comments: Diffuse muscle atrophy   Skin:     General: Skin is warm and dry.   Neurological:      Mental Status: She is alert and oriented to person, place, and time.       Fluids    Intake/Output Summary (Last 24 hours) at 1/16/2023 1554  Last data filed at 1/16/2023 0904  Gross per 24 hour   Intake 1500 ml   Output --   Net 1500 ml       Laboratory  Recent Labs     01/15/23  0954 01/15/23  2035 01/16/23  0115   WBC 11.9* 9.4 6.5   RBC 3.43* 3.38* 3.29*   HEMOGLOBIN 9.9* 9.7* 9.5*   HEMATOCRIT 30.5* 30.1* 29.8*   MCV 88.9 89.1 90.6   MCH 28.9 28.7 28.9   MCHC 32.5* 32.2* 31.9*   RDW 47.5 47.5 48.6   PLATELETCT 498* 454* 449*   MPV 9.7 9.8 10.2     Recent Labs     01/15/23  0954 01/15/23  2035 01/16/23  0115   SODIUM 136 138 136   POTASSIUM 2.9* 2.8* 3.3*   CHLORIDE 100 103 103   CO2 23 23 21   GLUCOSE 128* 117* 151*    BUN 13 15 12   CREATININE 0.64 0.62 0.57   CALCIUM 8.5 8.3* 8.0*     Recent Labs     01/14/23 2018   APTT 41.0*   INR 1.25*               Imaging  CT-CTA CHEST PULMONARY ARTERY W/ RECONS   Final Result         1.  No large central pulmonary embolus is appreciated, evaluation of the subsegmental branches is essentially nondiagnostic due to motion artifacts. Additional imaging would be required for definitive exclusion of small distal pulmonary emboli.   2.  Scattered bilateral pulmonary infiltrates, greatest in the lung bases   3.  Mediastinal adenopathy, workup and evaluation for causes of adenopathy recommended as clinically appropriate.   4.  Atherosclerosis.      DX-CHEST-PORTABLE (1 VIEW)   Final Result         1.  Pulmonary edema and/or infiltrates are identified, which are somewhat decreased since the prior exam.   2.  Atherosclerosis      US-EXTREMITY VENOUS LOWER BILAT    (Results Pending)        Assessment/Plan  * Pneumonia due to COVID-19 virus- (present on admission)  Assessment & Plan  On room air  Isolation      Candida esophagitis (HCC)  Assessment & Plan  Diflucan ordered    COVID-19- (present on admission)  Assessment & Plan  On room air  Continue support  Isolation       Normocytic anemia- (present on admission)  Assessment & Plan  Likely due to HIV and malnutrition  Continue to monitor    Community acquired pneumonia- (present on admission)  Assessment & Plan  hx of HIV, noncompliant with treatment  CD4 is likely very low  Dr. Seo consulted plan for augmentin for CAP and bactrim for PCP ppx    Polysubstance abuse (HCC)- (present on admission)  Assessment & Plan  Heroin use  UDS positive for amphetamine and opiate  She is trying to be more consistent with methadone clinic    Hypokalemia- (present on admission)  Assessment & Plan  Repleting  Monitor leve    HIV (human immunodeficiency virus infection) (HCC)- (present on admission)  Assessment & Plan  Uncontrolled  Lymphocytes very low, CD4  pending  Dr. Seo consulted         VTE prophylaxis: enoxaparin ppx    I have performed a physical exam and reviewed and updated ROS and Plan today (1/16/2023). In review of yesterday's note (1/15/2023), there are no changes except as documented above.

## 2023-01-17 ENCOUNTER — APPOINTMENT (OUTPATIENT)
Dept: RADIOLOGY | Facility: MEDICAL CENTER | Age: 63
DRG: 974 | End: 2023-01-17
Attending: INTERNAL MEDICINE
Payer: COMMERCIAL

## 2023-01-17 ENCOUNTER — PHARMACY VISIT (OUTPATIENT)
Dept: PHARMACY | Facility: MEDICAL CENTER | Age: 63
End: 2023-01-17
Payer: COMMERCIAL

## 2023-01-17 VITALS
TEMPERATURE: 98.1 F | RESPIRATION RATE: 17 BRPM | HEART RATE: 77 BPM | BODY MASS INDEX: 16.37 KG/M2 | SYSTOLIC BLOOD PRESSURE: 120 MMHG | WEIGHT: 89.51 LBS | DIASTOLIC BLOOD PRESSURE: 76 MMHG | OXYGEN SATURATION: 94 %

## 2023-01-17 PROBLEM — E87.6 HYPOKALEMIA: Status: RESOLVED | Noted: 2022-11-26 | Resolved: 2023-01-17

## 2023-01-17 LAB
1 3 BETA D GLUCAN INTERP Q4483: ABNORMAL
1,3 BETA GLUCAN SER-MCNC: 60 PG/ML
ANION GAP SERPL CALC-SCNC: 10 MMOL/L (ref 7–16)
ANNOTATION COMMENT IMP: ABNORMAL
ANNOTATION COMMENT IMP: ABNORMAL
BACTERIA UR CULT: NORMAL
BASOPHILS # BLD AUTO: 0 % (ref 0–1.8)
BASOPHILS # BLD: 0 K/UL (ref 0–0.12)
BUN SERPL-MCNC: 13 MG/DL (ref 8–22)
CALCIUM SERPL-MCNC: 8.5 MG/DL (ref 8.5–10.5)
CD3 CELLS # BLD: 1132 CELLS/UL (ref 570–2400)
CD3 CELLS # BLD: 874 CELLS/UL (ref 570–2400)
CD3+CD4+ CELLS # BLD: 115 CELLS/UL (ref 430–1800)
CD3+CD4+ CELLS # BLD: 116 CELLS/UL (ref 430–1800)
CD3+CD4+ CELLS/CD3+CD8+ CLL BLD: 0.11 RATIO (ref 0.8–3.9)
CD3+CD4+ CELLS/CD3+CD8+ CLL BLD: 0.15 RATIO (ref 0.8–3.9)
CD3+CD8+ CELLS # BLD: 1014 CELLS/UL (ref 210–1200)
CD3+CD8+ CELLS # BLD: 757 CELLS/UL (ref 210–1200)
CHLORIDE SERPL-SCNC: 106 MMOL/L (ref 96–112)
CO2 SERPL-SCNC: 22 MMOL/L (ref 20–33)
CREAT SERPL-MCNC: 0.61 MG/DL (ref 0.5–1.4)
EOSINOPHIL # BLD AUTO: 0 K/UL (ref 0–0.51)
EOSINOPHIL NFR BLD: 0 % (ref 0–6.9)
ERYTHROCYTE [DISTWIDTH] IN BLOOD BY AUTOMATED COUNT: 49.3 FL (ref 35.9–50)
GFR SERPLBLD CREATININE-BSD FMLA CKD-EPI: 100 ML/MIN/1.73 M 2
GLUCOSE SERPL-MCNC: 82 MG/DL (ref 65–99)
HCT VFR BLD AUTO: 34.3 % (ref 37–47)
HGB BLD-MCNC: 11 G/DL (ref 12–16)
HIV-1 NAAT (COPIES/ML) L204479A: ABNORMAL
HIV-1 NAAT (LOG COPIES/ML) L295410: 4.93 LOG CPY/ML
HIV1 RNA SERPL QL NAA+PROBE: DETECTED
LYMPHOCYTES # BLD AUTO: 1.82 K/UL (ref 1–4.8)
LYMPHOCYTES NFR BLD: 31.4 % (ref 22–41)
MAGNESIUM SERPL-MCNC: 2 MG/DL (ref 1.5–2.5)
MANUAL DIFF BLD: NORMAL
MCH RBC QN AUTO: 28.9 PG (ref 27–33)
MCHC RBC AUTO-ENTMCNC: 32.1 G/DL (ref 33.6–35)
MCV RBC AUTO: 90.3 FL (ref 81.4–97.8)
MONOCYTES # BLD AUTO: 0.88 K/UL (ref 0–0.85)
MONOCYTES NFR BLD AUTO: 15.2 % (ref 0–13.4)
MORPHOLOGY BLD-IMP: NORMAL
NEUTROPHILS # BLD AUTO: 3.1 K/UL (ref 2–7.15)
NEUTROPHILS NFR BLD: 53.4 % (ref 44–72)
NRBC # BLD AUTO: 0 K/UL
NRBC BLD-RTO: 0 /100 WBC
PLATELET # BLD AUTO: 250 K/UL (ref 164–446)
PLATELET BLD QL SMEAR: NORMAL
PMV BLD AUTO: 10.9 FL (ref 9–12.9)
POTASSIUM SERPL-SCNC: 4.6 MMOL/L (ref 3.6–5.5)
RBC # BLD AUTO: 3.8 M/UL (ref 4.2–5.4)
RBC BLD AUTO: PRESENT
SIGNIFICANT IND 70042: NORMAL
SITE SITE: NORMAL
SMUDGE CELLS BLD QL SMEAR: NORMAL
SODIUM SERPL-SCNC: 138 MMOL/L (ref 135–145)
SOURCE SOURCE: NORMAL
WBC # BLD AUTO: 5.8 K/UL (ref 4.8–10.8)

## 2023-01-17 PROCEDURE — 83735 ASSAY OF MAGNESIUM: CPT

## 2023-01-17 PROCEDURE — 80048 BASIC METABOLIC PNL TOTAL CA: CPT

## 2023-01-17 PROCEDURE — 99239 HOSP IP/OBS DSCHRG MGMT >30: CPT | Performed by: INTERNAL MEDICINE

## 2023-01-17 PROCEDURE — 85007 BL SMEAR W/DIFF WBC COUNT: CPT

## 2023-01-17 PROCEDURE — 700102 HCHG RX REV CODE 250 W/ 637 OVERRIDE(OP): Performed by: INTERNAL MEDICINE

## 2023-01-17 PROCEDURE — A9270 NON-COVERED ITEM OR SERVICE: HCPCS | Performed by: INTERNAL MEDICINE

## 2023-01-17 PROCEDURE — RXMED WILLOW AMBULATORY MEDICATION CHARGE: Performed by: INTERNAL MEDICINE

## 2023-01-17 PROCEDURE — 85025 COMPLETE CBC W/AUTO DIFF WBC: CPT

## 2023-01-17 RX ORDER — SULFAMETHOXAZOLE AND TRIMETHOPRIM 800; 160 MG/1; MG/1
1 TABLET ORAL DAILY
Qty: 41 TABLET | Refills: 1 | Status: ACTIVE | OUTPATIENT
Start: 2023-01-18 | End: 2023-02-28

## 2023-01-17 RX ORDER — FLUCONAZOLE 200 MG/1
200 TABLET ORAL DAILY
Qty: 7 TABLET | Refills: 0 | Status: ACTIVE | OUTPATIENT
Start: 2023-01-18 | End: 2023-01-25

## 2023-01-17 RX ORDER — AMOXICILLIN AND CLAVULANATE POTASSIUM 500; 125 MG/1; MG/1
1 TABLET, FILM COATED ORAL EVERY 8 HOURS
Qty: 30 TABLET | Refills: 0 | Status: ACTIVE | OUTPATIENT
Start: 2023-01-17 | End: 2023-01-17 | Stop reason: SDUPTHER

## 2023-01-17 RX ORDER — AMOXICILLIN AND CLAVULANATE POTASSIUM 500; 125 MG/1; MG/1
1 TABLET, FILM COATED ORAL EVERY 8 HOURS
Qty: 6 TABLET | Refills: 0 | Status: ACTIVE | OUTPATIENT
Start: 2023-01-17 | End: 2023-01-19

## 2023-01-17 RX ADMIN — SULFAMETHOXAZOLE AND TRIMETHOPRIM 1 TABLET: 800; 160 TABLET ORAL at 05:59

## 2023-01-17 RX ADMIN — FLUCONAZOLE 200 MG: 100 TABLET ORAL at 05:59

## 2023-01-17 RX ADMIN — METHADONE HYDROCHLORIDE 5 MG: 10 TABLET ORAL at 05:59

## 2023-01-17 RX ADMIN — AMOXICILLIN AND CLAVULANATE POTASSIUM 1 TABLET: 500; 125 TABLET, FILM COATED ORAL at 05:59

## 2023-01-17 NOTE — PROGRESS NOTES
Bedside report received from SALOMÓN Caldera. Patient is alert and oriented x  4, room air, SR on the monitor. Fall precautions are in place. Call light is in reach.

## 2023-01-17 NOTE — DISCHARGE SUMMARY
Discharge Summary    CHIEF COMPLAINT ON ADMISSION  Chief Complaint   Patient presents with    Shortness of Breath       Reason for Admission  HIV infection  COVID-19 infection    Admission Date  1/15/2023    CODE STATUS  Full Code    HPI & HOSPITAL COURSE    62-year-old female with history of HIV noncompliant with medication, heroin abuse who recently was admitted on 11/25  for PNA and left and AMA on 11/26, patient came back on 1/15 with worsening shortness of breath and coughing, COVID test was positive unfortunately patient also left AMA and came back to the emergency after a few hours with worsening shortness of breath, urine drug screen was positive for meth and.,  CTA for lung did not see any PE however showed bilateral infiltration, patient did not need oxygen therapy.  Infectious disease Dr. Seo was consulted and evaluated the patient, patient has been off off HAART for 3-4 months due to noncompliance.  CD4/CD8 is pending, 1-3 beta D glucan was positive and procalcitonin was elevated 1.1, patient received ceftriaxone and discharged on Augmentin.  Infectious disease recommended to continue fluconazole 200 mg daily for 7 days for esophageal candidiasis and thrush also recommended to continue Bactrim 1 double strength daily, encouraged the patient to follow-up with [facility] to follow-up with Dr. Seo and start HIV medication, had long discussion with the patient about the importance of treating HIV, discussed the risk of worsening infection and death, patient understood and agreed.    On the day of discharge the patient was alert oriented x4, patient was on room air, no significant leukocytosis, her gait was stable, patient is okay for discharge and close monitoring as outpatient.  Patient has cachectic appearance and muscles wast.    Therefore, she is discharged in good and stable condition to home with close outpatient follow-up.    The patient met 2-midnight criteria for an inpatient stay at the  time of discharge.    Discharge Date  01/17/23      FOLLOW UP ITEMS POST DISCHARGE  Follow-up with Dr. Seo ID for HIV  Follow-up with PCP and psychiatry clinic for drug abuse    DISCHARGE DIAGNOSES  Principal Problem:    Pneumonia due to COVID-19 virus POA: Yes  Active Problems:    HIV (human immunodeficiency virus infection) (HCC) POA: Yes    Hypokalemia POA: Yes    Polysubstance abuse (HCC) POA: Yes    Community acquired pneumonia POA: Yes    Normocytic anemia POA: Yes    COVID-19 POA: Yes    Candida esophagitis (HCC) POA: Unknown  Resolved Problems:    * No resolved hospital problems. *      FOLLOW UP  No future appointments.  Stoney Linder D.O.  [address] [place] [id number] [phone]    Follow up in 1 week(s)        MEDICATIONS ON DISCHARGE     Medication List        START taking these medications        Instructions   amoxicillin-clavulanate 500-125 MG Tabs  Commonly known as: AUGMENTIN   Take 1 Tablet by mouth every 8 hours for 2 days.  Dose: 1 Tablet     fluconazole 200 MG Tabs  Start taking on: January 18, 2023  Commonly known as: DIFLUCAN   Take 1 Tablet by mouth every day for 7 days.  Dose: 200 mg     sulfamethoxazole-trimethoprim 800-160 MG tablet  Start taking on: January 18, 2023  Commonly known as: BACTRIM DS   Take 1 Tablet by mouth every day for 41 days (Please call pharmacy for refills or continuation of therapy)  Dose: 1 Tablet              Allergies  No Known Allergies    DIET  Orders Placed This Encounter   Procedures    Diet Order Diet: Regular     Standing Status:   Standing     Number of Occurrences:   1     Order Specific Question:   Diet:     Answer:   Regular [1]       ACTIVITY  As tolerated.  Weight bearing as tolerated    CONSULTATIONS  Dr. Rayray MARSHALL    PROCEDURES  None    LABORATORY  Lab Results   Component Value Date    SODIUM 138 01/17/2023    POTASSIUM 4.6 01/17/2023    CHLORIDE 106 01/17/2023    CO2 22 01/17/2023    GLUCOSE 82 01/17/2023    BUN 13  01/17/2023    CREATININE 0.61 01/17/2023        Lab Results   Component Value Date    WBC 5.8 01/17/2023    HEMOGLOBIN 11.0 (L) 01/17/2023    HEMATOCRIT 34.3 (L) 01/17/2023    PLATELETCT 250 01/17/2023        Total time of the discharge process exceeds 35 minutes.

## 2023-01-17 NOTE — CONSULTS
DATE OF SERVICE:  01/16/2023     INFECTIOUS DISEASE CONSULTATION     REQUESTING PHYSICIAN:  Zak Bee MD     IDENTIFICATION:  This is a 62-year-old patient seen for evaluation of   pneumonia and HIV.     HISTORY OF PRESENT ILLNESS:  The patient was initially seen in the emergency   room at the end of November, at which time she was diagnosed with the flu.  She   now presented again to the emergency room on the 14th with chest congestion   and cough and found to be COVID positive.  She was initially admitted, but   then went AMA and then came back in the next day to be re-admitted, so this is   her followup of her second admission.  The patient has a history of HIV dating   back about 12 years to [place].  She was a patient at the [facility]   there as [facility] of [facility] and she says she was compliant with medicines and   she was doing well and in addition, she got two COVID shots and a booster as   well as flu shots.  Approximately a year ago, she and her long-term partner   who is HIV negative decided to move to [place]; however, for whatever reason,   they have not been able to establish stable housing.  She is now staying in a   motel with him.  The patient has also been referred to the [facility] and   has had multiple appointments and she has been mostly missing them.  Her last   blood test in December showed a CD4 count of 229.  There is no viral load.    Her RPR was negative and in July the last time, her viral load was checked was   12,000, at which point her CD4 count was 282.  The patient now comes in with   a cough and congestion.  She has also noted that she is lost a lot of weight.    Does not have diarrhea, fever or chills.  It is not clear as to why this is   going on.  She also notices that it is difficult to swallow and food has no   taste and has difficulty going down her throat.  She continues to be actively   using amphetamines and heroin and has had difficulty smoking last few days   because  for her cough.  She has not been on HIV meds for the last year and   previously was on Triumeq.  She has been admitted now.  She has been put on   high-dose Bactrim for the possibility of PCP as well as prednisone for the PCP   as well as COVID.  She is feeling somewhat better today, still with   difficulty swallowing and she is oxygenating well on room air.  She denies any   AIDS defining illnesses and she has not been on any medicines for the last   year.     PAST MEDICAL HISTORY:  She has had multiple staph skin infections and scarring   on her abdomen from this.  She has a history of hepatitis C from drug use and   she was cured she said with what seemed to be a 3-month regimen a few years   ago and has been told that it has not recurred.  She has the HIV and those are   her major medical problems.     SOCIAL HISTORY:  She is living with her long-term partner.  She has 4 children   that live in [place], she says are aware of her diagnosis.  She has not   been in contact with them for over a year.     ALLERGIES:  None known.     MEDICATIONS:  At present include:  1.  IV ceftriaxone.  This is her second dose.  2.  Bactrim double strength twice a day, started yesterday.  3.  Prednisone 40 mg a day.     REVIEW OF SYSTEMS:  As documented above.     PHYSICAL EXAMINATION:  GENERAL:  She is a wasted-appearing female.  Her BMI is 16.4.  Her actual   weight is 89 pounds.  VITAL SIGNS:  She does appear comfortable, but does have a congested cough.    Her pulse ox is 95-96 on room air as we checked.  HEENT:  Oral exam shows dentures and her mouth is full of thrush.  NECK:  Supple.  LUNGS:  Posteriorly show a lot of loud rhonchi bilaterally, particularly the   right base.  CARDIAC:  No murmur.  ABDOMEN:  Belly exam, she has multiple scars and scabs. I cannot palpate her   liver or spleen easily.  EXTREMITIES:  Show wasting and ____ pulses.     IMAGING STUDIES:  Chest x-ray and CT scan show bilateral infiltrates, not    particularly ground glass, but just interstitial changes, patchy bilateral   infiltrates, no effusion, no cavities.     LABORATORY DATA:  Her white count was 11.7 on admission on the 14th, today it   is down to 6.5.  Her hemoglobin was 8.9 on admission.  Her chemistry panel   shows normal transaminases, albumin was 3.0 on admission.  .  Urine tox   positive for amphetamines and opiates.  Renal function normal.  Alkaline   phosphatase 105.     MICROBIOLOGY:  Blood cultures are negative.     ASSESSMENT:  This is a 62-year-old patient who has been noncompliant for over   a year with human immunodeficiency virus medicines even though they are   offered for free and she also could get free housing assistance at [facility], she has been noncompliant with followup. At this point, she does show   evidence of wasting.  Her mouth is full of thrush.  Her labs show normal LDH.    On chest x-ray, patchy infiltrates and she has rhonchi bilaterally on exam.    She also was COVID positive, but oxygenating well   on room air.  1.  Acquired immunodeficiency syndrome.  She now has wasting and probable   esophageal candidiasis secondary to noncompliance.  She has been put on   high-dose Bactrim, but given her physical exam findings and a normal LDH and   CT scan findings all point away from the diagnosis of pneumocystis and she   should be on just PCP prophylaxis.  2.  Esophageal candidiasis.  We will need to treat to clear it up.  3.  COVID positive.  4.  Presumptive community-acquired pneumonia.  She did have a mildly elevated   procalcitonin and she has rhonchi on exam, but she is oxygenating better.     RECOMMENDATIONS:    1.  Decrease Bactrim to 1 double strength tablet daily.  2.  Give her 400 mg IV fluconazole x1 dose for loading and then put her on 200   mg a day orally for 7 days for the esophageal candidiasis and thrush.  3.  The patient needs to follow up with the [facility].  The patient has already   had 2 days of  Rocephin.  We will give her 1 more day of Augmentin to complete   a short course of therapy for community-acquired pneumonia.  She is off oxygen   and doing well at this point.  4.  The patient encouraged to follow up with the [facility].  I will also   inform the case workers that this patient is interested in getting on therapy.  I have   explicitly told this patient that her life expectancy is very short at this   point unless she starts becoming compliant and follow up for her HIV care.    The patient expressed understanding.        ______________________________  MD COLTON MATTHEW/VARUN/CULLEN    DD:  01/16/2023 14:50  DT:  01/16/2023 16:49    Job#:  [id number]

## 2023-01-17 NOTE — CARE PLAN
The patient is Stable - Low risk of patient condition declining or worsening    Shift Goals  Clinical Goals: manage withdrawal symptoms, promote sleep  Patient Goals: sleep    Progress made toward(s) clinical / shift goals:    Problem: Knowledge Deficit - Standard  Goal: Patient and family/care givers will demonstrate understanding of plan of care, disease process/condition, diagnostic tests and medications  Outcome: Progressing     Problem: Pain - Standard  Goal: Alleviation of pain or a reduction in pain to the patient’s comfort goal  Outcome: Progressing       Patient is not progressing towards the following goals:
